# Patient Record
Sex: MALE | Race: BLACK OR AFRICAN AMERICAN | NOT HISPANIC OR LATINO
[De-identification: names, ages, dates, MRNs, and addresses within clinical notes are randomized per-mention and may not be internally consistent; named-entity substitution may affect disease eponyms.]

---

## 2020-03-10 ENCOUNTER — APPOINTMENT (OUTPATIENT)
Dept: UROLOGY | Facility: CLINIC | Age: 70
End: 2020-03-10
Payer: MEDICARE

## 2020-03-10 VITALS
TEMPERATURE: 97.9 F | HEIGHT: 70 IN | DIASTOLIC BLOOD PRESSURE: 77 MMHG | BODY MASS INDEX: 21.9 KG/M2 | OXYGEN SATURATION: 98 % | RESPIRATION RATE: 13 BRPM | HEART RATE: 79 BPM | SYSTOLIC BLOOD PRESSURE: 117 MMHG | WEIGHT: 153 LBS

## 2020-03-10 DIAGNOSIS — R31.21 ASYMPTOMATIC MICROSCOPIC HEMATURIA: ICD-10-CM

## 2020-03-10 DIAGNOSIS — Z78.9 OTHER SPECIFIED HEALTH STATUS: ICD-10-CM

## 2020-03-10 DIAGNOSIS — N52.9 MALE ERECTILE DYSFUNCTION, UNSPECIFIED: ICD-10-CM

## 2020-03-10 DIAGNOSIS — I10 ESSENTIAL (PRIMARY) HYPERTENSION: ICD-10-CM

## 2020-03-10 DIAGNOSIS — R35.1 BENIGN PROSTATIC HYPERPLASIA WITH LOWER URINARY TRACT SYMPMS: ICD-10-CM

## 2020-03-10 DIAGNOSIS — Z86.39 PERSONAL HISTORY OF OTHER ENDOCRINE, NUTRITIONAL AND METABOLIC DISEASE: ICD-10-CM

## 2020-03-10 DIAGNOSIS — N40.1 BENIGN PROSTATIC HYPERPLASIA WITH LOWER URINARY TRACT SYMPMS: ICD-10-CM

## 2020-03-10 DIAGNOSIS — R63.4 ABNORMAL WEIGHT LOSS: ICD-10-CM

## 2020-03-10 DIAGNOSIS — E78.00 PURE HYPERCHOLESTEROLEMIA, UNSPECIFIED: ICD-10-CM

## 2020-03-10 PROBLEM — Z00.00 ENCOUNTER FOR PREVENTIVE HEALTH EXAMINATION: Status: ACTIVE | Noted: 2020-03-10

## 2020-03-10 PROCEDURE — 99204 OFFICE O/P NEW MOD 45 MIN: CPT

## 2020-03-10 RX ORDER — SIMVASTATIN 20 MG/1
20 TABLET, FILM COATED ORAL
Refills: 0 | Status: ACTIVE | COMMUNITY

## 2020-03-10 RX ORDER — TAMSULOSIN HYDROCHLORIDE 0.4 MG/1
0.4 CAPSULE ORAL
Qty: 90 | Refills: 2 | Status: ACTIVE | COMMUNITY
Start: 2020-03-10 | End: 1900-01-01

## 2020-03-10 RX ORDER — METFORMIN HYDROCHLORIDE 500 MG/1
500 TABLET, COATED ORAL
Refills: 0 | Status: ACTIVE | COMMUNITY

## 2020-03-10 NOTE — REVIEW OF SYSTEMS
[Feeling Tired] : feeling tired [Poor quality erections] : Poor quality erections [Discharge from urine canal] : discharge from urine canal [Bladder pressure] : experiences bladder pressure [Slow urine stream] : slow urine stream [Leakage of urine with urgency] : leakage of urine with urgency [Leakage of urine with straining, coughing, laughing] : leakage of urine with straining, coughing, laughing [Joint Pain] : joint pain [Anxiety] : anxiety [Depression] : depression [Muscle Weakness] : muscle weakness [Negative] : Heme/Lymph

## 2020-03-10 NOTE — ASSESSMENT
[FreeTextEntry1] : We had a long discussion regarding patient's urinary symptoms. Initial workup with cystoscopy, determination of urinary flow rate, post void residual volume and urodynamic study was discussed. We discussed conservative management without using medications such as controlling constipation, limiting fluids before bed-time, and limiting irritating substances such as coffee, tea, alcohol, spicy foods, etc. We also discussed medication therapy for treatment of urinary symptoms with alpha-blocker therapy (such as Flomax, Rapaflo), 5 alpha reductase inhibitors (such as Proscar and Avodart), Cialis 5 mg daily especially if ED is present, anticholinergic medications (such as VESIcare, Toviaz), Myrbetriq or a combination of the above medications. Treatment of overactive bladder symptoms with Botox intravesical injections was reviewed. Also surgical treatment options such as office microwave thermotherapy, photo-vaporization of the prostate (Greenlight laser), TURP or suprapubic prostatectomy based on the size of the prostate, were discussed and side effects reviewed. Questions were answered. He will start by limiting intake of sweet drinks and checking with his endocrinologist to improve diabetes. For now, he will start Flomax. We also have discussed using daily Cialis for both urinary symptoms and erectile dysfunction. He would rather improve urinary symptoms first.\par PSA level and urinalysis will be checked. When he returns to the office in a few months, we could start addressing erectile dysfunction also. We discussed the difference between microscopic and gross hematuria. Potential benign and malignant urological conditions that can cause hematuria were reviewed. Workup including renal imaging with ultrasonography or CT scan, urine studies and cystoscopy were reviewed. Risks of cystoscopy were discussed. Patient was made aware that despite adequate workup, the cause for hematuria may not be discovered and continued followup is recommended. This could also be addressed after management of urinary symptoms.\par \par Adán Gant MD, FACS\par [facility]\par  of Urology\par \par [address], [address]\[place] [address]\par \par [address], [address]\[place] [address]\par \par Tel: [phone]\par Fax: [phone]

## 2020-03-10 NOTE — PHYSICAL EXAM
[General Appearance - Well Developed] : well developed [Normal Appearance] : normal appearance [Well Groomed] : well groomed [General Appearance - In No Acute Distress] : no acute distress [Edema] : no peripheral edema [Respiration, Rhythm And Depth] : normal respiratory rhythm and effort [Exaggerated Use Of Accessory Muscles For Inspiration] : no accessory muscle use [Abdomen Soft] : soft [Abdomen Tenderness] : non-tender [Costovertebral Angle Tenderness] : no ~M costovertebral angle tenderness [Urethral Meatus] : meatus normal [Penis Abnormality] : normal circumcised penis [Urinary Bladder Findings] : the bladder was normal on palpation [Scrotum] : the scrotum was normal [Testes Mass (___cm)] : there were no testicular masses [Prostate Tenderness] : the prostate was not tender [No Prostate Nodules] : no prostate nodules [Prostate Enlarged] : was enlarged [Normal Station and Gait] : the gait and station were normal for the patient's age [] : no rash [No Focal Deficits] : no focal deficits [Oriented To Time, Place, And Person] : oriented to person, place, and time [Mood] : the mood was normal [Not Anxious] : not anxious [Inguinal Lymph Nodes Enlarged Bilaterally] : inguinal

## 2020-03-10 NOTE — HISTORY OF PRESENT ILLNESS
[FreeTextEntry1] : He is a 69-year-old man who is seen today for initial visit. He has nocturia more than 5 times and daytime urinary frequency and urgency every hour. He has urge incontinence. Urinary flow sometimes is normal and sometimes slow with hesitancy. He has history of diabetes but has not seen his primary care recently and his glucose monitoring system is not working. He drinks over 1 gallon of soda, iced tea, juice and water a day. Today he limited fluid intake because he was coming to the office and has not needed to urinate in the last 4-6 hours. Residual urine today was less than 50 cc. In 2019, hemoglobin A1c was 8.5 and urinalysis showed red blood cells, glucose and protein. PSA level was 3.3 in 2018. He is a non-smoker. He never had gross hematuria. He has postvoid dribbling. Also the has not had a good erection in many years. He has a bowel movement as soon as he eats. He has lost weight but does not know how much. AUA symptom score was 27.

## 2020-03-10 NOTE — LETTER BODY
[Dear  ___] : Dear  [unfilled], [Consult Letter:] : I had the pleasure of evaluating your patient, [unfilled]. [Consult Closing:] : Thank you very much for allowing me to participate in the care of this patient.  If you have any questions, please do not hesitate to contact me. [FreeTextEntry1] : [facility]\par [address] \par [place], [place], [address]  \par [phone] \par

## 2020-03-11 LAB
APPEARANCE: CLEAR
BACTERIA: NEGATIVE
BILIRUBIN URINE: NEGATIVE
BLOOD URINE: NEGATIVE
COLOR: YELLOW
GLUCOSE QUALITATIVE U: ABNORMAL
HYALINE CASTS: 1 /LPF
KETONES URINE: NEGATIVE
LEUKOCYTE ESTERASE URINE: NEGATIVE
MICROSCOPIC-UA: NORMAL
NITRITE URINE: NEGATIVE
PH URINE: 5.5
PROTEIN URINE: NORMAL
PSA SERPL-MCNC: 3.31 NG/ML
RED BLOOD CELLS URINE: 1 /HPF
SPECIFIC GRAVITY URINE: 1.04
SQUAMOUS EPITHELIAL CELLS: 0 /HPF
UROBILINOGEN URINE: NORMAL
WHITE BLOOD CELLS URINE: 1 /HPF

## 2020-06-10 ENCOUNTER — APPOINTMENT (OUTPATIENT)
Dept: UROLOGY | Facility: CLINIC | Age: 70
End: 2020-06-10

## 2021-05-08 ENCOUNTER — INPATIENT (INPATIENT)
Facility: HOSPITAL | Age: 71
LOS: 2 days | Discharge: HOME | End: 2021-05-11
Attending: HOSPITALIST | Admitting: HOSPITALIST
Payer: MEDICARE

## 2021-05-08 VITALS
DIASTOLIC BLOOD PRESSURE: 75 MMHG | RESPIRATION RATE: 18 BRPM | HEART RATE: 92 BPM | TEMPERATURE: 98 F | SYSTOLIC BLOOD PRESSURE: 129 MMHG | OXYGEN SATURATION: 100 %

## 2021-05-08 DIAGNOSIS — N28.1 CYST OF KIDNEY, ACQUIRED: ICD-10-CM

## 2021-05-08 DIAGNOSIS — Z29.9 ENCOUNTER FOR PROPHYLACTIC MEASURES, UNSPECIFIED: ICD-10-CM

## 2021-05-08 DIAGNOSIS — Z02.9 ENCOUNTER FOR ADMINISTRATIVE EXAMINATIONS, UNSPECIFIED: ICD-10-CM

## 2021-05-08 DIAGNOSIS — E87.2 ACIDOSIS: ICD-10-CM

## 2021-05-08 DIAGNOSIS — I10 ESSENTIAL (PRIMARY) HYPERTENSION: ICD-10-CM

## 2021-05-08 DIAGNOSIS — E78.5 HYPERLIPIDEMIA, UNSPECIFIED: ICD-10-CM

## 2021-05-08 DIAGNOSIS — E87.1 HYPO-OSMOLALITY AND HYPONATREMIA: ICD-10-CM

## 2021-05-08 DIAGNOSIS — R73.9 HYPERGLYCEMIA, UNSPECIFIED: ICD-10-CM

## 2021-05-08 DIAGNOSIS — E11.65 TYPE 2 DIABETES MELLITUS WITH HYPERGLYCEMIA: ICD-10-CM

## 2021-05-08 DIAGNOSIS — Z98.890 OTHER SPECIFIED POSTPROCEDURAL STATES: Chronic | ICD-10-CM

## 2021-05-08 DIAGNOSIS — M88.9 OSTEITIS DEFORMANS OF UNSPECIFIED BONE: ICD-10-CM

## 2021-05-08 LAB
ALBUMIN SERPL ELPH-MCNC: 4.1 G/DL — SIGNIFICANT CHANGE UP (ref 3.3–5)
ALP SERPL-CCNC: 2829 U/L — HIGH (ref 40–120)
ALT FLD-CCNC: 5 U/L — SIGNIFICANT CHANGE UP (ref 4–41)
ANION GAP SERPL CALC-SCNC: 12 MMOL/L — SIGNIFICANT CHANGE UP (ref 7–14)
ANION GAP SERPL CALC-SCNC: 15 MMOL/L — HIGH (ref 7–14)
APPEARANCE UR: CLEAR — SIGNIFICANT CHANGE UP
AST SERPL-CCNC: 14 U/L — SIGNIFICANT CHANGE UP (ref 4–40)
B-OH-BUTYR SERPL-SCNC: 0.2 MMOL/L — SIGNIFICANT CHANGE UP (ref 0–0.4)
B-OH-BUTYR SERPL-SCNC: 0.4 MMOL/L — SIGNIFICANT CHANGE UP (ref 0–0.4)
BASOPHILS # BLD AUTO: 0.04 K/UL — SIGNIFICANT CHANGE UP (ref 0–0.2)
BASOPHILS NFR BLD AUTO: 0.5 % — SIGNIFICANT CHANGE UP (ref 0–2)
BILIRUB SERPL-MCNC: 1.7 MG/DL — HIGH (ref 0.2–1.2)
BILIRUB UR-MCNC: NEGATIVE — SIGNIFICANT CHANGE UP
BLOOD GAS VENOUS COMPREHENSIVE RESULT: SIGNIFICANT CHANGE UP
BUN SERPL-MCNC: 8 MG/DL — SIGNIFICANT CHANGE UP (ref 7–23)
BUN SERPL-MCNC: 9 MG/DL — SIGNIFICANT CHANGE UP (ref 7–23)
CALCIUM SERPL-MCNC: 8.1 MG/DL — LOW (ref 8.4–10.5)
CALCIUM SERPL-MCNC: 8.8 MG/DL — SIGNIFICANT CHANGE UP (ref 8.4–10.5)
CHLORIDE SERPL-SCNC: 94 MMOL/L — LOW (ref 98–107)
CHLORIDE SERPL-SCNC: 98 MMOL/L — SIGNIFICANT CHANGE UP (ref 98–107)
CO2 SERPL-SCNC: 23 MMOL/L — SIGNIFICANT CHANGE UP (ref 22–31)
CO2 SERPL-SCNC: 24 MMOL/L — SIGNIFICANT CHANGE UP (ref 22–31)
COLOR SPEC: SIGNIFICANT CHANGE UP
CREAT SERPL-MCNC: 0.9 MG/DL — SIGNIFICANT CHANGE UP (ref 0.5–1.3)
CREAT SERPL-MCNC: 0.91 MG/DL — SIGNIFICANT CHANGE UP (ref 0.5–1.3)
D DIMER BLD IA.RAPID-MCNC: 172 NG/ML DDU — SIGNIFICANT CHANGE UP
DIFF PNL FLD: NEGATIVE — SIGNIFICANT CHANGE UP
EOSINOPHIL # BLD AUTO: 0.04 K/UL — SIGNIFICANT CHANGE UP (ref 0–0.5)
EOSINOPHIL NFR BLD AUTO: 0.5 % — SIGNIFICANT CHANGE UP (ref 0–6)
GLUCOSE BLDC GLUCOMTR-MCNC: 228 MG/DL — HIGH (ref 70–99)
GLUCOSE BLDC GLUCOMTR-MCNC: 240 MG/DL — HIGH (ref 70–99)
GLUCOSE SERPL-MCNC: 578 MG/DL — CRITICAL HIGH (ref 70–99)
GLUCOSE SERPL-MCNC: 599 MG/DL — CRITICAL HIGH (ref 70–99)
GLUCOSE UR QL: ABNORMAL
HCT VFR BLD CALC: 42.4 % — SIGNIFICANT CHANGE UP (ref 39–50)
HGB BLD-MCNC: 15.2 G/DL — SIGNIFICANT CHANGE UP (ref 13–17)
IANC: 5.07 K/UL — SIGNIFICANT CHANGE UP (ref 1.5–8.5)
IMM GRANULOCYTES NFR BLD AUTO: 0.3 % — SIGNIFICANT CHANGE UP (ref 0–1.5)
KETONES UR-MCNC: NEGATIVE — SIGNIFICANT CHANGE UP
LEUKOCYTE ESTERASE UR-ACNC: NEGATIVE — SIGNIFICANT CHANGE UP
LYMPHOCYTES # BLD AUTO: 1.86 K/UL — SIGNIFICANT CHANGE UP (ref 1–3.3)
LYMPHOCYTES # BLD AUTO: 24.9 % — SIGNIFICANT CHANGE UP (ref 13–44)
MCHC RBC-ENTMCNC: 29.3 PG — SIGNIFICANT CHANGE UP (ref 27–34)
MCHC RBC-ENTMCNC: 35.8 GM/DL — SIGNIFICANT CHANGE UP (ref 32–36)
MCV RBC AUTO: 81.7 FL — SIGNIFICANT CHANGE UP (ref 80–100)
MONOCYTES # BLD AUTO: 0.43 K/UL — SIGNIFICANT CHANGE UP (ref 0–0.9)
MONOCYTES NFR BLD AUTO: 5.8 % — SIGNIFICANT CHANGE UP (ref 2–14)
NEUTROPHILS # BLD AUTO: 5.07 K/UL — SIGNIFICANT CHANGE UP (ref 1.8–7.4)
NEUTROPHILS NFR BLD AUTO: 68 % — SIGNIFICANT CHANGE UP (ref 43–77)
NITRITE UR-MCNC: NEGATIVE — SIGNIFICANT CHANGE UP
NRBC # BLD: 0 /100 WBCS — SIGNIFICANT CHANGE UP
NRBC # FLD: 0 K/UL — SIGNIFICANT CHANGE UP
NT-PROBNP SERPL-SCNC: 45 PG/ML — SIGNIFICANT CHANGE UP
PH UR: 6 — SIGNIFICANT CHANGE UP (ref 5–8)
PLATELET # BLD AUTO: 245 K/UL — SIGNIFICANT CHANGE UP (ref 150–400)
POTASSIUM SERPL-MCNC: 3.7 MMOL/L — SIGNIFICANT CHANGE UP (ref 3.5–5.3)
POTASSIUM SERPL-MCNC: 4.7 MMOL/L — SIGNIFICANT CHANGE UP (ref 3.5–5.3)
POTASSIUM SERPL-SCNC: 3.7 MMOL/L — SIGNIFICANT CHANGE UP (ref 3.5–5.3)
POTASSIUM SERPL-SCNC: 4.7 MMOL/L — SIGNIFICANT CHANGE UP (ref 3.5–5.3)
PROT SERPL-MCNC: 7.5 G/DL — SIGNIFICANT CHANGE UP (ref 6–8.3)
PROT UR-MCNC: NEGATIVE — SIGNIFICANT CHANGE UP
RBC # BLD: 5.19 M/UL — SIGNIFICANT CHANGE UP (ref 4.2–5.8)
RBC # FLD: 13 % — SIGNIFICANT CHANGE UP (ref 10.3–14.5)
SARS-COV-2 RNA SPEC QL NAA+PROBE: SIGNIFICANT CHANGE UP
SODIUM SERPL-SCNC: 133 MMOL/L — LOW (ref 135–145)
SODIUM SERPL-SCNC: 133 MMOL/L — LOW (ref 135–145)
SP GR SPEC: 1.04 — HIGH (ref 1.01–1.02)
TROPONIN T, HIGH SENSITIVITY RESULT: 28 NG/L — SIGNIFICANT CHANGE UP
TROPONIN T, HIGH SENSITIVITY RESULT: 30 NG/L — SIGNIFICANT CHANGE UP
UROBILINOGEN FLD QL: SIGNIFICANT CHANGE UP
WBC # BLD: 7.46 K/UL — SIGNIFICANT CHANGE UP (ref 3.8–10.5)
WBC # FLD AUTO: 7.46 K/UL — SIGNIFICANT CHANGE UP (ref 3.8–10.5)

## 2021-05-08 PROCEDURE — 99285 EMERGENCY DEPT VISIT HI MDM: CPT | Mod: CS,25

## 2021-05-08 PROCEDURE — 71046 X-RAY EXAM CHEST 2 VIEWS: CPT | Mod: 26

## 2021-05-08 PROCEDURE — 93010 ELECTROCARDIOGRAM REPORT: CPT

## 2021-05-08 PROCEDURE — 71275 CT ANGIOGRAPHY CHEST: CPT | Mod: 26

## 2021-05-08 PROCEDURE — 74174 CTA ABD&PLVS W/CONTRAST: CPT | Mod: 26

## 2021-05-08 PROCEDURE — 99221 1ST HOSP IP/OBS SF/LOW 40: CPT | Mod: GC

## 2021-05-08 RX ORDER — SODIUM CHLORIDE 9 MG/ML
1000 INJECTION INTRAMUSCULAR; INTRAVENOUS; SUBCUTANEOUS
Refills: 0 | Status: DISCONTINUED | OUTPATIENT
Start: 2021-05-08 | End: 2021-05-09

## 2021-05-08 RX ORDER — POTASSIUM CHLORIDE 20 MEQ
20 PACKET (EA) ORAL ONCE
Refills: 0 | Status: COMPLETED | OUTPATIENT
Start: 2021-05-08 | End: 2021-05-08

## 2021-05-08 RX ORDER — SODIUM CHLORIDE 9 MG/ML
1000 INJECTION INTRAMUSCULAR; INTRAVENOUS; SUBCUTANEOUS ONCE
Refills: 0 | Status: COMPLETED | OUTPATIENT
Start: 2021-05-08 | End: 2021-05-08

## 2021-05-08 RX ORDER — ASPIRIN/CALCIUM CARB/MAGNESIUM 324 MG
162 TABLET ORAL DAILY
Refills: 0 | Status: DISCONTINUED | OUTPATIENT
Start: 2021-05-08 | End: 2021-05-09

## 2021-05-08 RX ORDER — INSULIN LISPRO 100/ML
6 VIAL (ML) SUBCUTANEOUS ONCE
Refills: 0 | Status: COMPLETED | OUTPATIENT
Start: 2021-05-08 | End: 2021-05-08

## 2021-05-08 RX ORDER — INSULIN GLARGINE 100 [IU]/ML
10 INJECTION, SOLUTION SUBCUTANEOUS AT BEDTIME
Refills: 0 | Status: DISCONTINUED | OUTPATIENT
Start: 2021-05-08 | End: 2021-05-09

## 2021-05-08 RX ADMIN — Medication 162 MILLIGRAM(S): at 13:20

## 2021-05-08 RX ADMIN — SODIUM CHLORIDE 1000 MILLILITER(S): 9 INJECTION INTRAMUSCULAR; INTRAVENOUS; SUBCUTANEOUS at 14:07

## 2021-05-08 RX ADMIN — Medication 6 UNIT(S): at 14:05

## 2021-05-08 RX ADMIN — INSULIN GLARGINE 10 UNIT(S): 100 INJECTION, SOLUTION SUBCUTANEOUS at 21:50

## 2021-05-08 RX ADMIN — SODIUM CHLORIDE 1000 MILLILITER(S): 9 INJECTION INTRAMUSCULAR; INTRAVENOUS; SUBCUTANEOUS at 13:20

## 2021-05-08 RX ADMIN — SODIUM CHLORIDE 1000 MILLILITER(S): 9 INJECTION INTRAMUSCULAR; INTRAVENOUS; SUBCUTANEOUS at 14:16

## 2021-05-08 RX ADMIN — Medication 20 MILLIEQUIVALENT(S): at 14:05

## 2021-05-08 RX ADMIN — SODIUM CHLORIDE 75 MILLILITER(S): 9 INJECTION INTRAMUSCULAR; INTRAVENOUS; SUBCUTANEOUS at 21:34

## 2021-05-08 NOTE — ED PROVIDER NOTE - OBJECTIVE STATEMENT
69 Y/O M PMH DM HTN HLD not on medication for 4 years due to non-compliance arrives via ambulance. Pt states he currently feels well, states he has just had achying in his arms and neck. Spoke to pt's wife for collateral at [phone] 71 Y/O M PMH DM HTN HLD not on medication for 4 years due to non-compliance arrives via ambulance. Pt states he currently feels well, states he has just had aching in his arms and neck. Spoke to pt's wife for collateral at . States that pt was C/O Chest pain, states that he felt like he could not breathe and endorsed pain in his R arm. States that pt does not take any of his medicine and mostly sits at home drinking Pepsi. Pt denies any complaints at the moment.

## 2021-05-08 NOTE — H&P ADULT - PROBLEM SELECTOR PLAN 6
BP stable for now  Pt has not been on antihypertensives for years  Will monitor BP, if elevated can start on CCB, ACEi, or ARB BP stable for now  Pt has not been on antihypertensives for years  Will monitor BP, if elevated can start on ACEi or ARB

## 2021-05-08 NOTE — H&P ADULT - HISTORY OF PRESENT ILLNESS
71 y/o male with pmh of HTN, DM, and HLD presents to the ED for increasing fatigue. The patient stated this morning, he was laying in bed with his wife and was not "feeling well." His wife tried to elicit conversation from him, however the patient refrained from speaking with her and due to this concern she called the ambulance. The patient states he did not have any SOB, chest pain, or palpitations. No reported abdominal discomfort, nausea, or episodes of emesis. He was not lightheaded and did not lose consciousness. He has been having polydipsia and polyuria, denies any signs of neuropathy of upper or lower extremities and any change in vision from his baseline (wears glasses). He admits that he has not seen a primary care doctor in "years" and that he has not left his house in 4 years, since he retired. He stays in bed most of the day, by choice, however states he is able to ambulate without any assistance. He does not take any medications for his chronic medical conditions. He notes weight loss within the past 4 years from a baseline of 170-175 to 148 and endorses a good appetite. Admits to having multiple types of snacks present in his drawers.    ED VS: 98 F, 92, 129/75, 100% on RA. Fingerstick: 535   ED tx: 10 U Lantus subq and 6 U Admelog, 2L NS bolus  EKG : NSR, RBBB, TWI in leads V1-V3 69 y/o male with pmh of HTN, DM, and HLD presents to the ED for increasing fatigue. The patient stated this morning, he was laying in bed with his wife and was not "feeling well." His wife tried to elicit conversation from him, however the patient refrained from speaking with her and due to this concern she called the ambulance. The patient states he did not have any SOB, chest pain, or palpitations. No reported abdominal discomfort, nausea, or episodes of emesis. He was not lightheaded and did not lose consciousness. He has been having polydipsia and polyuria, denies any signs of neuropathy of upper or lower extremities and any change in vision from his baseline (wears glasses). Noted by pt's wife, to have been SOB and with right shoulder discomfort prior to coming to the ED. Wife states on several occasions the patient did not "look well," and she has called the ambulance every 2 months for these symptoms, the patient normally refuses to go to the hospital, however this time he agreed to. He admits that he has not seen a primary care doctor in "years" and that he has not left his house in 4 years, since he retired. He stays in bed most of the day, by choice, however states he is able to ambulate without any assistance. He does not take any medications for his chronic medical conditions. He notes weight loss within the past 4 years from a baseline of 170-175 to 148 and endorses a good appetite. Admits to having multiple types of snacks present in his drawers.    ED VS: 98 F, 92, 129/75, 100% on RA. Fingerstick: 535   ED tx: 10 U Lantus subq and 6 U Admelog, 2L NS bolus  EKG : NSR, RBBB, TWI in leads V1-V3 71 y/o male with pmh of HTN, DM, and HLD presents to the ED for increasing fatigue. The patient stated this morning, he was laying in bed with his wife and was not "feeling well." His wife tried to elicit conversation from him, however the patient refrained from speaking with her and due to this concern she called the ambulance. The patient states he did not have any SOB, chest pain, or palpitations. No reported abdominal discomfort, nausea, or episodes of emesis. He was not lightheaded and did not lose consciousness. He has been having polydipsia and polyuria, denies any signs of neuropathy of upper or lower extremities and any change in vision from his baseline (wears glasses). Noted by pt's wife, to have been SOB and with right shoulder discomfort prior to coming to the ED. Wife states on several occasions the patient did not "look well," and she has called the ambulance every 2 months for these symptoms, the patient normally refuses to go to the hospital, however this time he agreed to. He admits that he has not seen a primary care doctor in "years" and that he has not left his house in 4 years, since he retired. He stays in bed most of the day, by choice, however states he is able to ambulate without any assistance. He does not take any medications for his chronic medical conditions. He notes weight loss within the past 4 years from a baseline of 170-175 to 148 and endorses a good appetite. Admits to having multiple types of snacks present in his drawers. Drinks a lot of soda and eats a lot of chips.    ED VS: 98 F, 92, 129/75, 100% on RA. Fingerstick: 535   ED tx: 10 U Lantus subq and 6 U Admelog, 2L NS bolus  EKG : NSR, RBBB, TWI in leads V1-V3

## 2021-05-08 NOTE — H&P ADULT - NSHPSOCIALHISTORY_GEN_ALL_CORE
Retired. 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  Has stopped smoking for 40 years now, did smoke from [age] (22 years, approximately 1 pack/week).  Previously used marijuana and cocaine, has stopped since 1997.  Previous alcohol use, stopped in 2010.   to wife, lives with her.   Ambulates on own without assistance.

## 2021-05-08 NOTE — H&P ADULT - PROBLEM SELECTOR PLAN 1
Initial symptom of weakness as per patient, found to be hyperglycemic with BS: 535, acidotic (7.32 downtrending to 7.28), however HCO3 normal, U/A negative for ketones, positive for glucose, AG: 15 to 12, and with normal mental status.  S/p 10 U Lantus and 6 U Admelog in ED  Start on 14 U Lantus and 5 U Admelog TID  Can adjust insulin regimen as pt starts on diet  Maintain inpatient goal of BS: 140-180  Carb-consistent Diet  Monitor fingersticks  Monitor BMP QD Initial symptom of weakness as per patient, found to be hyperglycemic with BS: 535, acidotic (7.32 downtrending to 7.28), however HCO3 normal, U/A negative for ketones, positive for glucose, AG: 15 to 12, and with normal mental status. A1c: 12.8 (5/2021)  S/p 10 U Lantus and 6 U Admelog in ED  Start on 14 U Lantus and 5 U Admelog TID  Can adjust insulin regimen as pt starts on diet  Maintain inpatient goal of BS: 140-180  Carb-consistent Diet  Monitor fingersticks  Monitor BMP QD Initial symptom of weakness as per patient, found to be hyperglycemic with BS: 535, acidotic (7.32 downtrending to 7.28), however HCO3 normal, U/A negative for ketones, positive for glucose, AG: 15 to 12, and with normal mental status. A1c: 12.8 (5/2021)  S/p 10 U Lantus and 6 U Admelog in ED  Start on 14 U Lantus and 5 U Admelog TID  Can adjust insulin regimen as pt starts on diet  Endocrine consult in AM  Maintain inpatient goal of BS: 140-180  Carb-consistent Diet  Monitor fingersticks  Monitor BMP QD Initial symptom of weakness as per patient, found to be hyperglycemic with BS: 535, acidotic (7.32 downtrending to 7.28), however HCO3 normal, U/A negative for ketones, positive for glucose, AG: 15 to 12, and with normal mental status. A1c: 12.8 (5/2021)  S/p 10 U Lantus and 6 U Admelog in ED  Start on 14 U Lantus and 5 U Admelog TID  Start on moderate insulin sliding scale  Can adjust insulin regimen as pt starts on diet  Endocrine consult in AM  Maintain inpatient goal of BS: 140-180  Carb-consistent Diet  Monitor fingersticks  Monitor BMP QD Initial symptom of weakness as per patient, found to be hyperglycemic with BS: 535, acidotic (7.32 downtrending to 7.28), however HCO3 normal, U/A negative for ketones, positive for glucose, AG: 15 to 12, and with normal mental status. A1c: 12.8 (5/2021)  S/p 10 U Lantus and 6 U Admelog in ED  Start on 14 U Lantus and 5 U Admelog TID  Start on moderate insulin sliding scale  Can adjust insulin regimen as pt starts on diet  Endocrine consult in AM  Will require Diabetes education and teaching  Maintain inpatient goal of BS: 140-180  Carb-consistent Diet  Monitor fingersticks  Monitor BMP QD

## 2021-05-08 NOTE — ED PROVIDER NOTE - ATTENDING CONTRIBUTION TO CARE
70M p/w SOB.  EKG SR at 94 no lui sm.  deep TWI anteriorly however RBBB poss strain.  qtc 492  pr 148.  h/o HTN DM.  Pt reports occasional SOB.  R arm soreness and R neck soreness.  Apparently wife called EMS because he was laying about the house.  Found to have 's; will rx fluids, check labs r/o DKA.  Given SOB and EKG change will send dimer and trop.  ROSA Fontana spoke to his wife who said he had chest pain and arm pain and said he couldn't breathe.  Will admit r/o ACS, glucose mgmt.  Wife reports she found a whole fridge she didn't know about filled with sugary drinks and disposed of them.  Pt agreeable to admission for glucose mgmt.  Would not place in CDU given abnormal EKG.  VS:  unremarkable    GEN - NAD;  malaise;   A+O x3 Very thin. Loose clothes.  HEAD - NC/AT     ENT - PEERL, EOMI, mucous membranes  dry , no discharge      NECK: Neck supple, non-tender without lymphadenopathy, no masses, no JVD  PULM - CTA b/l,  symmetric breath sounds  COR -  normal heart sounds    ABD - , ND, NT, soft,  BACK - no CVA tenderness, nontender spine     EXTREMS - no edema, no deformity, warm and well perfused    SKIN - no rash    or bruising      NEUROLOGIC - alert, face symmetric, speech fluent, sensation nl, motor no focal deficit.

## 2021-05-08 NOTE — H&P ADULT - PROBLEM SELECTOR PLAN 2
Secondary to hyperglycemia, corrected Na: 141  Will likely improve as sugars become normalized  Will monitor BMP QD Noted to have pH: 7.32 downtrending to 7.28, with elevated lactate of 3.8 uptrended to 4.7, mild anion gap of 15 downtrended to 12  S/p 2L NS bolus in ED  Most likely Type 2 lactic acidosis 2/2 to the patient's hyperglycemia  Continue on NS IVF, reassess volume status in AM  Will check serum lactate in AM Noted to have pH: 7.32 downtrending to 7.28, with elevated lactate of 3.8 uptrended to 4.7, mild anion gap of 15 downtrended to 12  S/p 2L NS bolus in ED  Most likely Type B lactic acidosis 2/2 to the patient's hyperglycemia  Continue on NS IVF, reassess volume status in AM  Will check serum lactate in AM

## 2021-05-08 NOTE — H&P ADULT - PROBLEM SELECTOR PLAN 7
Hx of HLD  Will order lipid panel in AM  Start on atorvastatin Hx of HLD  Will order lipid panel in AM  Consider starting pt on a statin

## 2021-05-08 NOTE — H&P ADULT - PROBLEM SELECTOR PLAN 9
PCP: Wife to provide   Dispo: PT consult in AM 1. Name of PCP: Dr. Sean Evans  2. PCP contacted on Admission: []Y []N  3. PCP contacted at Discharge: []Y []N  4. Post-Discharge Appointment Date and Location:   5. Summary of Handoff given to PCP:

## 2021-05-08 NOTE — H&P ADULT - PROBLEM SELECTOR PLAN 5
Noted to have cortical thickening and sclerosis of right clavicle, pubic symphysis, and ischium.  Findings suggestive of Paget's disease.  Notable alkaline phosphatase of 2829, pt does have b/l shoulder and neck pain  Will benefit from endocrine consult in AM for possibly starting on bisphosphonates

## 2021-05-08 NOTE — ED PROVIDER NOTE - CLINICAL SUMMARY MEDICAL DECISION MAKING FREE TEXT BOX
69 Y/O M PMH DM HTN HLD not on medication for 4 years due to non-compliance arrives via ambulance. Pt states he currently feels well, states he has just had aching in his arms and neck.  Plan is trop to eval for NSTEMI, D-dimer to eval for PE and CXR to eval for consolidation. labs sent to R/O DKA. If potassium is normal will initiate insulin treatment, currently giving pt fluids. Pt appears to minimize symptoms and has been poorly compliant.

## 2021-05-08 NOTE — H&P ADULT - NSHPREVIEWOFSYSTEMS_GEN_ALL_CORE
REVIEW OF SYSTEMS:    CONSTITUTIONAL: Increased fatigue. No fevers or chills. Weight loss (currently 148 lbs, was previously 170-175, however 4 years ago)  EYES/ENT: No visual changes;  No vertigo or throat pain   NECK: No pain or stiffness  RESPIRATORY: No cough, wheezing, hemoptysis; No shortness of breath  CARDIOVASCULAR: No chest pain or palpitations  GASTROINTESTINAL: No abdominal or epigastric pain. No nausea, vomiting, or hematemesis; No diarrhea or constipation. No melena or hematochezia.  GENITOURINARY: Polyuria. No dysuria or hematuria.  NEUROLOGICAL: No numbness or weakness  SKIN: No itching, rashes REVIEW OF SYSTEMS:    CONSTITUTIONAL: Increased fatigue. No fevers or chills. Weight loss (currently 148 lbs, was previously 170-175, however 4 years ago)  EYES/ENT: No visual changes;  No vertigo or throat pain   NECK: No pain or stiffness  RESPIRATORY: No cough, wheezing, hemoptysis; No shortness of breath  CARDIOVASCULAR: No chest pain or palpitations  GASTROINTESTINAL: No abdominal or epigastric pain. No nausea, vomiting, or hematemesis; No diarrhea or constipation. No melena or hematochezia.  GENITOURINARY: Polyuria. No dysuria or hematuria.  NEUROLOGICAL: No numbness or weakness  ENDO: hyperglycemia, no heat intolerance  SKIN: No itching, rashes

## 2021-05-08 NOTE — ED PROVIDER NOTE - CARE PLAN
Principal Discharge DX:	Hyperglycemia  Secondary Diagnosis:	Abnormal EKG  Secondary Diagnosis:	Chest pain

## 2021-05-08 NOTE — ED ADULT NURSE REASSESSMENT NOTE - NS ED NURSE REASSESS COMMENT FT1
Patient is awake, A&O x 4, NAD, denies any s/sx at this time.  Presented to ED for not feeling well, neck and bilateral arm pain , chest pain and SOB.  Hyperglycemic upon arrival.  Per wife, patient non-complaint with medications and hasn't seen a doctor in 4 years.   Respirations equal and unlabored.  Vitals taken, FS checked and given insulin.  Admitted to med, report given and awaiting transport to unit.  Will continue to monitor patient.

## 2021-05-08 NOTE — H&P ADULT - NSHPLABSRESULTS_GEN_ALL_CORE
15.2   7.46  )-----------( 245      ( 08 May 2021 13:10 )             42.4           133<L>  |  98  |  9   ----------------------------<  578<HH>  4.7   |  23  |  0.91    Ca    8.1<L>      08 May 2021 14:38    TPro  7.5  /  Alb  4.1  /  TBili  1.7<H>  /  DBili  x   /  AST  14  /  ALT  5   /  AlkPhos  2829<H>                Urinalysis Basic - ( 08 May 2021 13:44 )    Color: Light Yellow / Appearance: Clear / S.038 / pH: x  Gluc: x / Ketone: Negative  / Bili: Negative / Urobili: <2 mg/dL   Blood: x / Protein: Negative / Nitrite: Negative   Leuk Esterase: Negative / RBC: x / WBC x   Sq Epi: x / Non Sq Epi: x / Bacteria: x      17:03 - VBG - pH: 7.28  | pCO2: 50    | pO2: <24   | Lactate: 4.7    16:07 - VBG - pH: 7.29  | pCO2: 52    | pO2: <24   | Lactate: 5.0    14:38 - VBG - pH: 7.30  | pCO2: 52    | pO2: 26    | Lactate: 3.3    13:10 - VBG - pH: 7.32  | pCO2: 50    | pO2: <24   | Lactate: 3.8        CARDIAC MARKERS ( 08 May 2021 14:42 )  x     / x     / 43 U/L / x     / x            CAPILLARY BLOOD GLUCOSE      POCT Blood Glucose.: 240 mg/dL (08 May 2021 23:10)      < from: CT Angio Abdomen and Pelvis w/ IV Cont (21 @ 18:46) >    IMPRESSION:    No thoracic or abdominal aortic dissection.    There is cortical thickening and sclerosis of the right clavicle. There is also cortical thickening and prominent trabecula in the bilateral iliac bones extending to the pubic symphysis and ischiums. The L1 vertebral body is also affected. The appearance is suggestive of Paget's disease however otheretiologies may be responsible. Correlate clinically.    Horseshoe kidney.    < end of copied text > 15.2   7.46  )-----------( 245      ( 08 May 2021 13:10 )             42.4           133<L>  |  98  |  9   ----------------------------<  578<HH>  4.7   |  23  |  0.91    Ca    8.1<L>      08 May 2021 14:38    TPro  7.5  /  Alb  4.1  /  TBili  1.7<H>  /  DBili  x   /  AST  14  /  ALT  5   /  AlkPhos  2829<H>              Urinalysis Basic - ( 08 May 2021 13:44 )    Color: Light Yellow / Appearance: Clear / S.038 / pH: x  Gluc: x / Ketone: Negative  / Bili: Negative / Urobili: <2 mg/dL   Blood: x / Protein: Negative / Nitrite: Negative   Leuk Esterase: Negative / RBC: x / WBC x   Sq Epi: x / Non Sq Epi: x / Bacteria: x      17:03 - VBG - pH: 7.28  | pCO2: 50    | pO2: <24   | Lactate: 4.7    16:07 - VBG - pH: 7.29  | pCO2: 52    | pO2: <24   | Lactate: 5.0    14:38 - VBG - pH: 7.30  | pCO2: 52    | pO2: 26    | Lactate: 3.3    13:10 - VBG - pH: 7.32  | pCO2: 50    | pO2: <24   | Lactate: 3.8        CARDIAC MARKERS ( 08 May 2021 14:42 )  x     / x     / 43 U/L / x     / x            CAPILLARY BLOOD GLUCOSE      POCT Blood Glucose.: 240 mg/dL (08 May 2021 23:10)      < from: CT Angio Abdomen and Pelvis w/ IV Cont (21 @ 18:46) >    IMPRESSION:    No thoracic or abdominal aortic dissection.    There is cortical thickening and sclerosis of the right clavicle. There is also cortical thickening and prominent trabecula in the bilateral iliac bones extending to the pubic symphysis and ischiums. The L1 vertebral body is also affected. The appearance is suggestive of Paget's disease however otheretiologies may be responsible. Correlate clinically.    Horseshoe kidney.    < end of copied text > Labs reviewed:  15.2   7.46  )-----------( 245      ( 08 May 2021 13:10 )             42.4           133<L>  |  98  |  9   ----------------------------<  578<HH>  4.7   |  23  |  0.91    Ca    8.1<L>      08 May 2021 14:38    TPro  7.5  /  Alb  4.1  /  TBili  1.7<H>  /  DBili  x   /  AST  14  /  ALT  5   /  AlkPhos  2829<H>              Urinalysis Basic - ( 08 May 2021 13:44 )    Color: Light Yellow / Appearance: Clear / S.038 / pH: x  Gluc: x / Ketone: Negative  / Bili: Negative / Urobili: <2 mg/dL   Blood: x / Protein: Negative / Nitrite: Negative   Leuk Esterase: Negative / RBC: x / WBC x   Sq Epi: x / Non Sq Epi: x / Bacteria: x      17:03 - VBG - pH: 7.28  | pCO2: 50    | pO2: <24   | Lactate: 4.7    16:07 - VBG - pH: 7.29  | pCO2: 52    | pO2: <24   | Lactate: 5.0    14:38 - VBG - pH: 7.30  | pCO2: 52    | pO2: 26    | Lactate: 3.3    13:10 - VBG - pH: 7.32  | pCO2: 50    | pO2: <24   | Lactate: 3.8        CARDIAC MARKERS ( 08 May 2021 14:42 )  x     / x     / 43 U/L / x     / x            CAPILLARY BLOOD GLUCOSE      POCT Blood Glucose.: 240 mg/dL (08 May 2021 23:10)      EKG personally reviewed and my interpretation is NSR, RBBB with repolarization abnormalities    Imaging reviewed below:    < from: CT Angio Abdomen and Pelvis w/ IV Cont (21 @ 18:46) >    IMPRESSION:    No thoracic or abdominal aortic dissection.    There is cortical thickening and sclerosis of the right clavicle. There is also cortical thickening and prominent trabecula in the bilateral iliac bones extending to the pubic symphysis and ischiums. The L1 vertebral body is also affected. The appearance is suggestive of Paget's disease however otheretiologies may be responsible. Correlate clinically.    Horseshoe kidney.    < end of copied text >

## 2021-05-08 NOTE — ED ADULT NURSE REASSESSMENT NOTE - NS ED NURSE REASSESS COMMENT FT1
at this time, ROSA Harmon aware. Pt continues to receive IVF at this time. Repeat labs drawn and sent. Will reassess.

## 2021-05-08 NOTE — H&P ADULT - ASSESSMENT
71 y/o male with pmh of HTN, DM, and HLD presents to the ED for increasing fatigue.  71 y/o male with pmh of HTN, DM, and HLD presents to the ED for increasing fatigue found to have hyperglycemia and lactic acidosis in the setting of uncontrolled diabetes mellitus.

## 2021-05-08 NOTE — H&P ADULT - NSHPPHYSICALEXAM_GEN_ALL_CORE
PHYSICAL EXAM:  T(C): 36.8 (05-08-21 @ 23:00), Max: 36.8 (05-08-21 @ 23:00)  HR: 70 (05-08-21 @ 23:00) (70 - 96)  BP: 130/76 (05-08-21 @ 23:00) (118/71 - 156/92)  RR: 18 (05-08-21 @ 23:00) (16 - 18)  SpO2: 98% (05-08-21 @ 23:00) (98% - 100%)  GENERAL: NAD, well-developed, AAOx4  HEAD:  Atraumatic, Normocephalic  EYES: EOMI, PERRLA, conjunctiva and sclera clear  NECK: Supple, No JVD  CHEST/LUNG: Clear to auscultation bilaterally; No wheezes or crackles  HEART: Regular rate and rhythm; No murmurs, rubs, or gallops  ABDOMEN: Soft, Nontender, Nondistended; Bowel sounds present  EXTREMITIES:  2+ Peripheral Pulses, No clubbing, cyanosis, or edema  NEUROLOGY: non-focal deficits, sensation intact to pinprick  SKIN: No rashes or lesions  Psych: Not depressed or anxious. PHYSICAL EXAM:  T(C): 36.8 (05-08-21 @ 23:00), Max: 36.8 (05-08-21 @ 23:00)  HR: 70 (05-08-21 @ 23:00) (70 - 96)  BP: 130/76 (05-08-21 @ 23:00) (118/71 - 156/92)  RR: 18 (05-08-21 @ 23:00) (16 - 18)  SpO2: 98% (05-08-21 @ 23:00) (98% - 100%)  GENERAL: NAD, well-developed, AAOx4  HEAD:  Atraumatic, Normocephalic  EYES: EOMI, PERRLA, conjunctiva and sclera clear  NECK: Supple, No JVD  LUNG: Clear to auscultation bilaterally; No wheezes or crackles, no tachypnea  HEART: Regular rate and rhythm; No murmurs, rubs, or gallops, no edeme  ABDOMEN: Soft, Nontender, Nondistended; Bowel sounds present  EXTREMITIES:  2+ Peripheral Pulses, No clubbing, cyanosis, or edema  NEUROLOGY: non-focal deficits, sensation intact to pinprick  SKIN: No rashes or lesions  Psych: Not depressed or anxious.

## 2021-05-08 NOTE — ED ADULT TRIAGE NOTE - CHIEF COMPLAINT QUOTE
Pt h/o htn and dm brought in for shortness of breath starting this morning and rt arm pain, pt has not taken medication for the past 4 years. Pt denies any present shortness of breath, endorsing pain to his rt arm. Pt hyperglycemic in triage.     Tram Renteria- [phone] Pt h/o htn and dm brought in for shortness of breath starting this morning and rt arm pain, pt has not taken medication for the past 4 years. Pt denies any present shortness of breath, endorsing pain to his rt arm. Pt hyperglycemic in triage.     Tram Renteria(wife)- [phone]

## 2021-05-08 NOTE — ED PROVIDER NOTE - PROGRESS NOTE DETAILS
Joseph Frankel PGY2: CT with evidence of pagets. Also most likely with HHS on presentation. Sugars have been coming down. Patient stable. Will admit for further work up and treatment.

## 2021-05-08 NOTE — H&P ADULT - PROBLEM SELECTOR PLAN 4
CTA performed originally for concern for hypoxia, incidental finding of bilateral renal cysts and horseshoe kidney, cysts not noted in any other visceral organs  Classification is based on Bosniak system  Monitor kidney function daily  Will need outpatient follow up for monitoring of renal cysts CTA performed originally for concern for hypoxia, incidental finding of bilateral renal cysts and horseshoe kidney, cysts not noted in any other visceral organs  Currently without any CVA tenderness, fevers, or chills  Monitor kidney function daily  Will need outpatient follow up for monitoring of renal cysts

## 2021-05-08 NOTE — H&P ADULT - PROBLEM SELECTOR PLAN 3
Noted to have pH: 7.32 downtrending to 7.28, with elevated lactate of 3.8 uptrended to 4.7  S/p 2L NS bolus in ED  Most likely 2/2 to Noted to have pH: 7.32 downtrending to 7.28, with elevated lactate of 3.8 uptrended to 4.7, mild anion gap of 15 downtrended to 12  S/p 2L NS bolus in ED  Most likely Type 2 lactic acidosis 2/2 to the patient's hyperglycemia, on the verge of going into DKA  Will check serum lactate in AM Noted to have pH: 7.32 downtrending to 7.28, with elevated lactate of 3.8 uptrended to 4.7, mild anion gap of 15 downtrended to 12  S/p 2L NS bolus in ED  Most likely Type 2 lactic acidosis 2/2 to the patient's hyperglycemia  Continue on NS IVF, reassess volume status in AM  Will check serum lactate in AM NSR, RBBB with repolarization abnormality. No priors to compare  -advised patient to follow up after discharge with a cardiologist for TTE  -trops flat, no CP, probnp normal, euvolemic on exam NSR, RBBB with repolarization abnormality. No priors to compare  Advised patient to follow up after discharge with a cardiologist for TTE  Trops flat, no CP, probnp normal, euvolemic on exam

## 2021-05-08 NOTE — ED ADULT NURSE NOTE - CHIEF COMPLAINT QUOTE
Pt h/o htn and dm brought in for shortness of breath starting this morning and rt arm pain, pt has not taken medication for the past 4 years. Pt denies any present shortness of breath, endorsing pain to his rt arm. Pt hyperglycemic in triage.     Tram Renteria(wife)- [phone]

## 2021-05-08 NOTE — H&P ADULT - NSICDXFAMILYHX_GEN_ALL_CORE_FT
FAMILY HISTORY:  Grandparent  Still living? Unknown  FH: diabetes mellitus, Age at diagnosis: Age Unknown

## 2021-05-08 NOTE — H&P ADULT - ATTENDING COMMENTS
70M with PMHx HTN, DM (not on meds, uncontrolled), HLD presents to the ED for increasing fatigue, polyuria, polydipsia found to have hyperglycemia to 500s and lactic acidosis in the setting of uncontrolled diabetes mellitus. Lactate improving after fluids and no signs of infection/sepsis. A1C 12.8 not on medications and will start on lantus 14u qhs, admelog 5u premeal, mod ISS, FSG goal 140-180 inpatient, consult endo. Patient may benefit from bisphosponates and can discuss with endo or initiate outpatient as well for incidental pagets disease on imaging. Regarding abnormal EKG with RBBB with repol abnormalities would f/u outpatient with cardiology for TTE. Denies CP/SOB, trops flat, probnp normal.

## 2021-05-09 DIAGNOSIS — E78.5 HYPERLIPIDEMIA, UNSPECIFIED: ICD-10-CM

## 2021-05-09 DIAGNOSIS — R74.8 ABNORMAL LEVELS OF OTHER SERUM ENZYMES: ICD-10-CM

## 2021-05-09 DIAGNOSIS — I10 ESSENTIAL (PRIMARY) HYPERTENSION: ICD-10-CM

## 2021-05-09 DIAGNOSIS — R94.31 ABNORMAL ELECTROCARDIOGRAM [ECG] [EKG]: ICD-10-CM

## 2021-05-09 DIAGNOSIS — E11.65 TYPE 2 DIABETES MELLITUS WITH HYPERGLYCEMIA: ICD-10-CM

## 2021-05-09 DIAGNOSIS — M88.9 OSTEITIS DEFORMANS OF UNSPECIFIED BONE: ICD-10-CM

## 2021-05-09 DIAGNOSIS — Z29.9 ENCOUNTER FOR PROPHYLACTIC MEASURES, UNSPECIFIED: ICD-10-CM

## 2021-05-09 LAB
24R-OH-CALCIDIOL SERPL-MCNC: 6.3 NG/ML — LOW (ref 30–80)
ANION GAP SERPL CALC-SCNC: 11 MMOL/L — SIGNIFICANT CHANGE UP (ref 7–14)
BASOPHILS # BLD AUTO: 0.04 K/UL — SIGNIFICANT CHANGE UP (ref 0–0.2)
BASOPHILS NFR BLD AUTO: 0.6 % — SIGNIFICANT CHANGE UP (ref 0–2)
BLOOD GAS VENOUS COMPREHENSIVE RESULT: SIGNIFICANT CHANGE UP
BUN SERPL-MCNC: 6 MG/DL — LOW (ref 7–23)
CALCIUM SERPL-MCNC: 8.4 MG/DL — SIGNIFICANT CHANGE UP (ref 8.4–10.5)
CHLORIDE SERPL-SCNC: 107 MMOL/L — SIGNIFICANT CHANGE UP (ref 98–107)
CHOLEST SERPL-MCNC: 159 MG/DL — SIGNIFICANT CHANGE UP
CO2 SERPL-SCNC: 22 MMOL/L — SIGNIFICANT CHANGE UP (ref 22–31)
COVID-19 SPIKE DOMAIN AB INTERP: NEGATIVE — SIGNIFICANT CHANGE UP
COVID-19 SPIKE DOMAIN ANTIBODY RESULT: 0.4 U/ML — SIGNIFICANT CHANGE UP
CREAT SERPL-MCNC: 0.68 MG/DL — SIGNIFICANT CHANGE UP (ref 0.5–1.3)
EOSINOPHIL # BLD AUTO: 0.07 K/UL — SIGNIFICANT CHANGE UP (ref 0–0.5)
EOSINOPHIL NFR BLD AUTO: 1 % — SIGNIFICANT CHANGE UP (ref 0–6)
GLUCOSE BLDC GLUCOMTR-MCNC: 141 MG/DL — HIGH (ref 70–99)
GLUCOSE BLDC GLUCOMTR-MCNC: 144 MG/DL — HIGH (ref 70–99)
GLUCOSE BLDC GLUCOMTR-MCNC: 187 MG/DL — HIGH (ref 70–99)
GLUCOSE BLDC GLUCOMTR-MCNC: 214 MG/DL — HIGH (ref 70–99)
GLUCOSE SERPL-MCNC: 179 MG/DL — HIGH (ref 70–99)
HCT VFR BLD CALC: 35 % — LOW (ref 39–50)
HCV AB S/CO SERPL IA: 0.11 S/CO — SIGNIFICANT CHANGE UP (ref 0–0.99)
HCV AB SERPL-IMP: SIGNIFICANT CHANGE UP
HDLC SERPL-MCNC: 48 MG/DL — SIGNIFICANT CHANGE UP
HGB BLD-MCNC: 12.6 G/DL — LOW (ref 13–17)
IANC: 3.4 K/UL — SIGNIFICANT CHANGE UP (ref 1.5–8.5)
IMM GRANULOCYTES NFR BLD AUTO: 0.3 % — SIGNIFICANT CHANGE UP (ref 0–1.5)
LIPID PNL WITH DIRECT LDL SERPL: 92 MG/DL — SIGNIFICANT CHANGE UP
LYMPHOCYTES # BLD AUTO: 2.88 K/UL — SIGNIFICANT CHANGE UP (ref 1–3.3)
LYMPHOCYTES # BLD AUTO: 42 % — SIGNIFICANT CHANGE UP (ref 13–44)
MAGNESIUM SERPL-MCNC: 2 MG/DL — SIGNIFICANT CHANGE UP (ref 1.6–2.6)
MCHC RBC-ENTMCNC: 28.7 PG — SIGNIFICANT CHANGE UP (ref 27–34)
MCHC RBC-ENTMCNC: 36 GM/DL — SIGNIFICANT CHANGE UP (ref 32–36)
MCV RBC AUTO: 79.7 FL — LOW (ref 80–100)
MONOCYTES # BLD AUTO: 0.44 K/UL — SIGNIFICANT CHANGE UP (ref 0–0.9)
MONOCYTES NFR BLD AUTO: 6.4 % — SIGNIFICANT CHANGE UP (ref 2–14)
NEUTROPHILS # BLD AUTO: 3.4 K/UL — SIGNIFICANT CHANGE UP (ref 1.8–7.4)
NEUTROPHILS NFR BLD AUTO: 49.7 % — SIGNIFICANT CHANGE UP (ref 43–77)
NON HDL CHOLESTEROL: 111 MG/DL — SIGNIFICANT CHANGE UP
NRBC # BLD: 0 /100 WBCS — SIGNIFICANT CHANGE UP
NRBC # FLD: 0 K/UL — SIGNIFICANT CHANGE UP
PHOSPHATE SERPL-MCNC: 2.8 MG/DL — SIGNIFICANT CHANGE UP (ref 2.5–4.5)
PLATELET # BLD AUTO: 193 K/UL — SIGNIFICANT CHANGE UP (ref 150–400)
POTASSIUM SERPL-MCNC: 3.7 MMOL/L — SIGNIFICANT CHANGE UP (ref 3.5–5.3)
POTASSIUM SERPL-SCNC: 3.7 MMOL/L — SIGNIFICANT CHANGE UP (ref 3.5–5.3)
RBC # BLD: 4.39 M/UL — SIGNIFICANT CHANGE UP (ref 4.2–5.8)
RBC # FLD: 13 % — SIGNIFICANT CHANGE UP (ref 10.3–14.5)
SARS-COV-2 IGG+IGM SERPL QL IA: 0.4 U/ML — SIGNIFICANT CHANGE UP
SARS-COV-2 IGG+IGM SERPL QL IA: NEGATIVE — SIGNIFICANT CHANGE UP
SODIUM SERPL-SCNC: 140 MMOL/L — SIGNIFICANT CHANGE UP (ref 135–145)
TRIGL SERPL-MCNC: 96 MG/DL — SIGNIFICANT CHANGE UP
WBC # BLD: 6.85 K/UL — SIGNIFICANT CHANGE UP (ref 3.8–10.5)
WBC # FLD AUTO: 6.85 K/UL — SIGNIFICANT CHANGE UP (ref 3.8–10.5)

## 2021-05-09 PROCEDURE — 72082 X-RAY EXAM ENTIRE SPI 2/3 VW: CPT | Mod: 26

## 2021-05-09 PROCEDURE — 72170 X-RAY EXAM OF PELVIS: CPT | Mod: 26

## 2021-05-09 PROCEDURE — 99223 1ST HOSP IP/OBS HIGH 75: CPT | Mod: GC

## 2021-05-09 PROCEDURE — 99233 SBSQ HOSP IP/OBS HIGH 50: CPT | Mod: GC

## 2021-05-09 RX ORDER — INSULIN GLARGINE 100 [IU]/ML
14 INJECTION, SOLUTION SUBCUTANEOUS AT BEDTIME
Refills: 0 | Status: DISCONTINUED | OUTPATIENT
Start: 2021-05-09 | End: 2021-05-09

## 2021-05-09 RX ORDER — DEXTROSE 50 % IN WATER 50 %
25 SYRINGE (ML) INTRAVENOUS ONCE
Refills: 0 | Status: DISCONTINUED | OUTPATIENT
Start: 2021-05-09 | End: 2021-05-11

## 2021-05-09 RX ORDER — SODIUM CHLORIDE 9 MG/ML
1000 INJECTION, SOLUTION INTRAVENOUS
Refills: 0 | Status: DISCONTINUED | OUTPATIENT
Start: 2021-05-09 | End: 2021-05-11

## 2021-05-09 RX ORDER — GLUCAGON INJECTION, SOLUTION 0.5 MG/.1ML
1 INJECTION, SOLUTION SUBCUTANEOUS ONCE
Refills: 0 | Status: DISCONTINUED | OUTPATIENT
Start: 2021-05-09 | End: 2021-05-11

## 2021-05-09 RX ORDER — ENOXAPARIN SODIUM 100 MG/ML
40 INJECTION SUBCUTANEOUS DAILY
Refills: 0 | Status: DISCONTINUED | OUTPATIENT
Start: 2021-05-09 | End: 2021-05-11

## 2021-05-09 RX ORDER — INSULIN LISPRO 100/ML
VIAL (ML) SUBCUTANEOUS AT BEDTIME
Refills: 0 | Status: DISCONTINUED | OUTPATIENT
Start: 2021-05-09 | End: 2021-05-09

## 2021-05-09 RX ORDER — INSULIN LISPRO 100/ML
VIAL (ML) SUBCUTANEOUS
Refills: 0 | Status: DISCONTINUED | OUTPATIENT
Start: 2021-05-09 | End: 2021-05-09

## 2021-05-09 RX ORDER — INSULIN LISPRO 100/ML
6 VIAL (ML) SUBCUTANEOUS
Refills: 0 | Status: DISCONTINUED | OUTPATIENT
Start: 2021-05-09 | End: 2021-05-11

## 2021-05-09 RX ORDER — INSULIN GLARGINE 100 [IU]/ML
10 INJECTION, SOLUTION SUBCUTANEOUS AT BEDTIME
Refills: 0 | Status: DISCONTINUED | OUTPATIENT
Start: 2021-05-09 | End: 2021-05-09

## 2021-05-09 RX ORDER — INSULIN GLARGINE 100 [IU]/ML
14 INJECTION, SOLUTION SUBCUTANEOUS AT BEDTIME
Refills: 0 | Status: DISCONTINUED | OUTPATIENT
Start: 2021-05-09 | End: 2021-05-11

## 2021-05-09 RX ORDER — INSULIN LISPRO 100/ML
VIAL (ML) SUBCUTANEOUS
Refills: 0 | Status: DISCONTINUED | OUTPATIENT
Start: 2021-05-09 | End: 2021-05-11

## 2021-05-09 RX ORDER — DEXTROSE 50 % IN WATER 50 %
15 SYRINGE (ML) INTRAVENOUS ONCE
Refills: 0 | Status: DISCONTINUED | OUTPATIENT
Start: 2021-05-09 | End: 2021-05-11

## 2021-05-09 RX ORDER — DEXTROSE 50 % IN WATER 50 %
12.5 SYRINGE (ML) INTRAVENOUS ONCE
Refills: 0 | Status: DISCONTINUED | OUTPATIENT
Start: 2021-05-09 | End: 2021-05-11

## 2021-05-09 RX ORDER — ATORVASTATIN CALCIUM 80 MG/1
40 TABLET, FILM COATED ORAL AT BEDTIME
Refills: 0 | Status: DISCONTINUED | OUTPATIENT
Start: 2021-05-09 | End: 2021-05-11

## 2021-05-09 RX ORDER — INSULIN LISPRO 100/ML
VIAL (ML) SUBCUTANEOUS AT BEDTIME
Refills: 0 | Status: DISCONTINUED | OUTPATIENT
Start: 2021-05-09 | End: 2021-05-11

## 2021-05-09 RX ORDER — INSULIN LISPRO 100/ML
5 VIAL (ML) SUBCUTANEOUS
Refills: 0 | Status: DISCONTINUED | OUTPATIENT
Start: 2021-05-09 | End: 2021-05-09

## 2021-05-09 RX ADMIN — SODIUM CHLORIDE 75 MILLILITER(S): 9 INJECTION INTRAMUSCULAR; INTRAVENOUS; SUBCUTANEOUS at 05:00

## 2021-05-09 RX ADMIN — Medication 1: at 18:36

## 2021-05-09 RX ADMIN — ATORVASTATIN CALCIUM 40 MILLIGRAM(S): 80 TABLET, FILM COATED ORAL at 22:45

## 2021-05-09 RX ADMIN — ENOXAPARIN SODIUM 40 MILLIGRAM(S): 100 INJECTION SUBCUTANEOUS at 13:20

## 2021-05-09 RX ADMIN — INSULIN GLARGINE 14 UNIT(S): 100 INJECTION, SOLUTION SUBCUTANEOUS at 22:45

## 2021-05-09 RX ADMIN — Medication 6 UNIT(S): at 18:37

## 2021-05-09 RX ADMIN — Medication 4: at 13:16

## 2021-05-09 RX ADMIN — Medication 5 UNIT(S): at 09:30

## 2021-05-09 RX ADMIN — Medication 5 UNIT(S): at 13:17

## 2021-05-09 NOTE — CONSULT NOTE ADULT - SUBJECTIVE AND OBJECTIVE BOX
HPI:  69 y/o male with T2DM (uncontrolled, HBA1C 12.8) (not on therapy), HTN, HLD p/w polyuria, polydipsia fatigue.    Reports he was diagnosed with T2DM 3-4 years ago. Was started on metformin 500 mg BID by his PMD but stopped after a week because he didn't want to be on medications. Hasn't seen PMD or any doctor in several years. States he was using free style gideon for a month after diagnosis and then stopped. Does not monitor BG. 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 Admits to being staying in bed most of the day. Has not seen Optho in 3 years, no known retinopathy. Denies numbness/tingling in feet.   States he did not eat anything yesterday in the hospital because wasn't given food per pt. Reports ate breakfast today.         PAST MEDICAL & SURGICAL HISTORY:  Hypertension    Diabetes    Hyperlipemia    History of oral surgery  5 teeth were removed        FAMILY HISTORY:  FH: diabetes mellitus (Grandparents)      Social History:  Denies tobacco use or alcohol abuse      Outpatient Medications: Home Medications:      MEDICATIONS  (STANDING):  dextrose 40% Gel 15 Gram(s) Oral once  dextrose 5%. 1000 milliLiter(s) (50 mL/Hr) IV Continuous <Continuous>  dextrose 5%. 1000 milliLiter(s) (100 mL/Hr) IV Continuous <Continuous>  dextrose 50% Injectable 25 Gram(s) IV Push once  dextrose 50% Injectable 12.5 Gram(s) IV Push once  dextrose 50% Injectable 25 Gram(s) IV Push once  enoxaparin Injectable 40 milliGRAM(s) SubCutaneous daily  glucagon  Injectable 1 milliGRAM(s) IntraMuscular once  insulin glargine Injectable (LANTUS) 14 Unit(s) SubCutaneous at bedtime  insulin lispro (ADMELOG) corrective regimen sliding scale   SubCutaneous three times a day before meals  insulin lispro (ADMELOG) corrective regimen sliding scale   SubCutaneous at bedtime  insulin lispro Injectable (ADMELOG) 5 Unit(s) SubCutaneous three times a day before meals    MEDICATIONS  (PRN):      Allergies    No Known Allergies    Intolerances      Review of Systems:  Constitutional: No fever, chills   Neuro: No tremors, headache   Cardiovascular: No chest pain, palpitations  Respiratory: No SOB, no cough  GI: No nausea, vomiting, abdominal pain  : No dysuria, polyuria   Skin: no rash, ulcers   Psych: no depression, anxiety   Endocrine: no polyphagia, polydipsia     ALL OTHER SYSTEMS REVIEWED AND NEGATIVE        PHYSICAL EXAM:  VITALS: T(C): 36.8 (05-09-21 @ 06:25)  T(F): 98.2 (05-09-21 @ 06:25), Max: 98.2 (05-08-21 @ 23:00)  HR: 72 (05-09-21 @ 06:25) (70 - 96)  BP: 124/70 (05-09-21 @ 06:25) (118/71 - 156/92)  RR:  (16 - 19)  SpO2:  (98% - 100%)  Wt(kg): --  GENERAL: NAD, well-groomed, well-developed  EYES: No proptosis, anicteric  HEENT:  Atraumatic, Normocephalic, moist mucous membranes  THYROID: Normal size, no palpable nodules  RESPIRATORY: Clear to auscultation bilaterally; No rales, rhonchi, wheezing  CARDIOVASCULAR: Regular rate and rhythm; No murmurs  GI: Soft, nontender, non distended, normal bowel sounds  SKIN: Dry, intact, No rashes or lesions  NEURO: AOx3, moves all extremities spontaneously   PSYCH: Reactive affect, euthymic mood    POCT Blood Glucose.: 144 mg/dL (05-09-21 @ 08:41)  POCT Blood Glucose.: 240 mg/dL (05-08-21 @ 23:10)  POCT Blood Glucose.: 228 mg/dL (05-08-21 @ 21:42)  POCT Blood Glucose.: 301 mg/dL (05-08-21 @ 15:49)  POCT Blood Glucose.: 525 mg/dL (05-08-21 @ 14:03)  POCT Blood Glucose.: 535 mg/dL (05-08-21 @ 12:20)                            12.6   6.85  )-----------( 193      ( 09 May 2021 07:14 )             35.0       05-09    140  |  107  |  6<L>  ----------------------------<  179<H>  3.7   |  22  |  0.68    EGFR if : 113  EGFR if non : 97    Ca    8.4      05-09  Mg     2.0     05-09  Phos  2.8     05-09    TPro  7.5  /  Alb  4.1  /  TBili  1.7<H>  /  DBili  x   /  AST  14  /  ALT  5   /  AlkPhos  2829<H>  05-08      Thyroid Function Tests:              Radiology:                HPI:  71 y/o male with T2DM (uncontrolled, HBA1C 12.8) (not on therapy), HTN, HLD p/w polyuria, polydipsia fatigue.    Reports he was diagnosed with T2DM 3-4 years ago. Was started on metformin 500 mg BID by his PMD but stopped after a week because he didn't want to be on medications. Hasn't seen PMD or any doctor in several years. States he was using free style gideon for a month after diagnosis and then stopped. Does not monitor BG. 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 Admits to being staying in bed most of the day. Has not seen Optho in 3 years, no known retinopathy. Denies numbness/tingling in feet.   States he did not eat anything yesterday in the hospital because wasn't given food per pt. Reports ate breakfast today.         PAST MEDICAL & SURGICAL HISTORY:  Hypertension    Diabetes    Hyperlipemia    History of oral surgery  5 teeth were removed        FAMILY HISTORY:  FH: diabetes mellitus (Grandparents)      Social History:  Denies tobacco use or alcohol abuse      Outpatient Medications: Home Medications:      MEDICATIONS  (STANDING):  dextrose 40% Gel 15 Gram(s) Oral once  dextrose 5%. 1000 milliLiter(s) (50 mL/Hr) IV Continuous <Continuous>  dextrose 5%. 1000 milliLiter(s) (100 mL/Hr) IV Continuous <Continuous>  dextrose 50% Injectable 25 Gram(s) IV Push once  dextrose 50% Injectable 12.5 Gram(s) IV Push once  dextrose 50% Injectable 25 Gram(s) IV Push once  enoxaparin Injectable 40 milliGRAM(s) SubCutaneous daily  glucagon  Injectable 1 milliGRAM(s) IntraMuscular once  insulin glargine Injectable (LANTUS) 14 Unit(s) SubCutaneous at bedtime  insulin lispro (ADMELOG) corrective regimen sliding scale   SubCutaneous three times a day before meals  insulin lispro (ADMELOG) corrective regimen sliding scale   SubCutaneous at bedtime  insulin lispro Injectable (ADMELOG) 5 Unit(s) SubCutaneous three times a day before meals    MEDICATIONS  (PRN):      Allergies    No Known Allergies    Intolerances      Review of Systems:  Constitutional: No fever, chills   Neuro: No tremors, headache   Cardiovascular: No chest pain, palpitations  Respiratory: No SOB, no cough  GI: No nausea, vomiting, abdominal pain  : No dysuria, polyuria   Skin: no rash, ulcers   Psych: no depression, anxiety   Endocrine: no polyphagia, polydipsia     ALL OTHER SYSTEMS REVIEWED AND NEGATIVE        PHYSICAL EXAM:  VITALS: T(C): 36.8 (05-09-21 @ 06:25)  T(F): 98.2 (05-09-21 @ 06:25), Max: 98.2 (05-08-21 @ 23:00)  HR: 72 (05-09-21 @ 06:25) (70 - 96)  BP: 124/70 (05-09-21 @ 06:25) (118/71 - 156/92)  RR:  (16 - 19)  SpO2:  (98% - 100%)  Wt(kg): --  GENERAL: NAD, well-groomed, well-developed  EYES: No proptosis, anicteric  HEENT:  Atraumatic, Normocephalic, moist mucous membranes  THYROID: Normal size, no palpable nodules  RESPIRATORY: Clear to auscultation bilaterally; No rales, rhonchi, wheezing  CARDIOVASCULAR: Regular rate and rhythm; No murmurs  GI: Soft, nontender, non distended, normal bowel sounds  SKIN: Dry, intact, No rashes or lesions  NEURO: AOx3, moves all extremities spontaneously   PSYCH: Reactive affect, euthymic mood    POCT Blood Glucose.: 144 mg/dL (05-09-21 @ 08:41)  POCT Blood Glucose.: 240 mg/dL (05-08-21 @ 23:10)  POCT Blood Glucose.: 228 mg/dL (05-08-21 @ 21:42)  POCT Blood Glucose.: 301 mg/dL (05-08-21 @ 15:49)  POCT Blood Glucose.: 525 mg/dL (05-08-21 @ 14:03)  POCT Blood Glucose.: 535 mg/dL (05-08-21 @ 12:20)                            12.6   6.85  )-----------( 193      ( 09 May 2021 07:14 )             35.0       05-09    140  |  107  |  6<L>  ----------------------------<  179<H>  3.7   |  22  |  0.68    EGFR if : 113  EGFR if non : 97    Ca    8.4      05-09  Mg     2.0     05-09  Phos  2.8     05-09    TPro  7.5  /  Alb  4.1  /  TBili  1.7<H>  /  DBili  x   /  AST  14  /  ALT  5   /  AlkPhos  2829<H>  05-08                  Radiology:   EXAM:  CT ANGIO ABD PELV (W)AW IC      EXAM:  CT ANGIO CHEST (W)AW IC        PROCEDURE DATE:  May  8 2021         INTERPRETATION:  CLINICAL INFORMATION: Rising lactate and arm pain, evaluate for dissection.    COMPARISON: None.    CONTRAST/COMPLICATIONS:  IV Contrast: Omnipaque 350  90 cc administered   10 cc discarded  Oral Contrast: NONE  Complications: None reported at time of study completion    PROCEDURE:  CT Angiography of the Chest, Abdomen and Pelvis.  Precontrast imaging was performed through the chest followed by arterial phase imaging of the chest, abdomen and pelvis.  Sagittal and coronal reformats were performed as well as 3D (MIP) reconstructions.    FINDINGS:  CHEST:  LUNGS AND LARGE AIRWAYS: Patent central airways. No pulmonary nodules.  PLEURA: No pleural effusion.  VESSELS: Intramural hematoma. No evidence of thoracic aortic dissection. Atherosclerotic calcifications of the coronary vessels.  HEART: Heart size is normal. No pericardial effusion.  MEDIASTINUM AND CORY: No lymphadenopathy.  CHEST WALL AND LOWER NECK: Within normal limits.    ABDOMEN AND PELVIS:  LIVER: Within normal limits.  BILE DUCTS: Normal caliber.  GALLBLADDER: Within normal limits.  SPLEEN: Within normal limits.  PANCREAS: Within normal limits.  ADRENALS: Within normal limits.  KIDNEYS/URETERS: Horseshoe kidney. No renal stones or hydronephrosis. Bilateral renal cysts, measuring up to 3.8 cm in the lower pole of the right moiety and 3.8 cm in the upper pole of the left moiety.    BLADDER: Within normal limits.  REPRODUCTIVE ORGANS: Prostate is enlarged.    BOWEL: No bowel obstruction. Appendix is normal.  PERITONEUM: No ascites.  VESSELS: No abdominal aortic dissection.  RETROPERITONEUM/LYMPH NODES: No lymphadenopathy.  ABDOMINAL WALL: Within normal limits.  BONES: There is cortical thickening and sclerosis of the right clavicle. There is also cortical thickening and prominent trabecula in the bilateral iliac bones extending to the pubic symphysis and ischiums. The L1 vertebral bodyis also affected. The appearance is suggestive of Paget's disease however other etiologies may be responsible. Correlate clinically.      IMPRESSION:    No thoracic or abdominal aortic dissection.    There is cortical thickening and sclerosis of the right clavicle. There is also cortical thickening and prominent trabecula in the bilateral iliac bones extending to the pubic symphysis and ischiums. The L1 vertebral body is also affected. The appearance is suggestive of Paget's disease however otheretiologies may be responsible. Correlate clinically.    Horseshoe kidney.                 HPI:  71 y/o male with T2DM (uncontrolled, HBA1C 12.8) (not on therapy), HTN, HLD p/w polyuria, polydipsia fatigue.    Reports he was diagnosed with T2DM 3-4 years ago. Was started on metformin 500 mg BID by his PMD but stopped after a week because he didn't want to be on medications. Hasn't seen PMD or any doctor in several years. States he was using free style gideon for a month after diagnosis and then stopped. Does not monitor BG. 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 Admits to being staying in bed most of the day. Has not seen Optho in 3 years, no known retinopathy. Denies numbness/tingling in feet. States he did not eat anything yesterday in the hospital because wasn't given food per pt. Reports ate breakfast today. Has lost 30 lbs.    Also found to have labs and imaging suggestive of Pagets disease - alk phos here 2898, severely elevated, and CT chest/abdomen/pelvis reveals likely involvement of vertebral body, right clavicle and pelvis. He denies prior history of fractures.       PAST MEDICAL & SURGICAL HISTORY:  Hypertension    DM2    Hyperlipemia    History of oral surgery  5 teeth were removed      FAMILY HISTORY:  FH: diabetes mellitus (Grandparents)      Social History:  Denies tobacco use or alcohol abuse    lives with wife       Outpatient Medications: Home Medications:  no medications for DM    MEDICATIONS  (STANDING):  dextrose 40% Gel 15 Gram(s) Oral once  dextrose 5%. 1000 milliLiter(s) (50 mL/Hr) IV Continuous <Continuous>  dextrose 5%. 1000 milliLiter(s) (100 mL/Hr) IV Continuous <Continuous>  dextrose 50% Injectable 25 Gram(s) IV Push once  dextrose 50% Injectable 12.5 Gram(s) IV Push once  dextrose 50% Injectable 25 Gram(s) IV Push once  enoxaparin Injectable 40 milliGRAM(s) SubCutaneous daily  glucagon  Injectable 1 milliGRAM(s) IntraMuscular once  insulin glargine Injectable (LANTUS) 14 Unit(s) SubCutaneous at bedtime  insulin lispro (ADMELOG) corrective regimen sliding scale   SubCutaneous three times a day before meals  insulin lispro (ADMELOG) corrective regimen sliding scale   SubCutaneous at bedtime  insulin lispro Injectable (ADMELOG) 5 Unit(s) SubCutaneous three times a day before meals    MEDICATIONS  (PRN):      Allergies  No Known Allergies    Review of Systems:  Constitutional: No fever, chills   Neuro: No tremors, headache   Cardiovascular: No chest pain, palpitations  Respiratory: No SOB, no cough  GI: No nausea, vomiting, abdominal pain  : No dysuria, polyuria   Skin: no rash, ulcers   Psych: no depression, anxiety   Endocrine: no polyphagia, polydipsia     ALL OTHER SYSTEMS REVIEWED AND NEGATIVE    PHYSICAL EXAM:  VITALS: T(C): 36.8 (05-09-21 @ 06:25)  T(F): 98.2 (05-09-21 @ 06:25), Max: 98.2 (05-08-21 @ 23:00)  HR: 72 (05-09-21 @ 06:25) (70 - 96)  BP: 124/70 (05-09-21 @ 06:25) (118/71 - 156/92)  RR:  (16 - 19)  SpO2:  (98% - 100%)  Wt(kg): --  GENERAL: NAD, well-groomed, well-developed  EYES: No proptosis, anicteric  HEENT:  Atraumatic, Normocephalic, moist mucous membranes  THYROID: Normal size, no palpable nodules  RESPIRATORY: Clear to auscultation bilaterally; No rales, rhonchi, wheezing  CARDIOVASCULAR: Regular rate and rhythm; No murmurs  GI: Soft, nontender, non distended, normal bowel sounds  SKIN: Dry, intact, No rashes or lesions  NEURO: AOx3, moves all extremities spontaneously   PSYCH: Reactive affect, euthymic mood    POCT Blood Glucose.: 144 mg/dL (05-09-21 @ 08:41)  POCT Blood Glucose.: 240 mg/dL (05-08-21 @ 23:10)  POCT Blood Glucose.: 228 mg/dL (05-08-21 @ 21:42)  POCT Blood Glucose.: 301 mg/dL (05-08-21 @ 15:49)  POCT Blood Glucose.: 525 mg/dL (05-08-21 @ 14:03)  POCT Blood Glucose.: 535 mg/dL (05-08-21 @ 12:20)                            12.6   6.85  )-----------( 193      ( 09 May 2021 07:14 )             35.0       05-09    140  |  107  |  6<L>  ----------------------------<  179<H>  3.7   |  22  |  0.68    EGFR if : 113  EGFR if non : 97    Ca    8.4      05-09  Mg     2.0     05-09  Phos  2.8     05-09    TPro  7.5  /  Alb  4.1  /  TBili  1.7<H>  /  DBili  x   /  AST  14  /  ALT  5   /  AlkPhos  2829<H>  05-08                  Radiology:   EXAM:  CT ANGIO ABD PELV (W)AW IC      EXAM:  CT ANGIO CHEST (W)AW IC        PROCEDURE DATE:  May  8 2021         INTERPRETATION:  CLINICAL INFORMATION: Rising lactate and arm pain, evaluate for dissection.    COMPARISON: None.    CONTRAST/COMPLICATIONS:  IV Contrast: Omnipaque 350  90 cc administered   10 cc discarded  Oral Contrast: NONE  Complications: None reported at time of study completion    PROCEDURE:  CT Angiography of the Chest, Abdomen and Pelvis.  Precontrast imaging was performed through the chest followed by arterial phase imaging of the chest, abdomen and pelvis.  Sagittal and coronal reformats were performed as well as 3D (MIP) reconstructions.    FINDINGS:  CHEST:  LUNGS AND LARGE AIRWAYS: Patent central airways. No pulmonary nodules.  PLEURA: No pleural effusion.  VESSELS: Intramural hematoma. No evidence of thoracic aortic dissection. Atherosclerotic calcifications of the coronary vessels.  HEART: Heart size is normal. No pericardial effusion.  MEDIASTINUM AND CORY: No lymphadenopathy.  CHEST WALL AND LOWER NECK: Within normal limits.    ABDOMEN AND PELVIS:  LIVER: Within normal limits.  BILE DUCTS: Normal caliber.  GALLBLADDER: Within normal limits.  SPLEEN: Within normal limits.  PANCREAS: Within normal limits.  ADRENALS: Within normal limits.  KIDNEYS/URETERS: Horseshoe kidney. No renal stones or hydronephrosis. Bilateral renal cysts, measuring up to 3.8 cm in the lower pole of the right moiety and 3.8 cm in the upper pole of the left moiety.    BLADDER: Within normal limits.  REPRODUCTIVE ORGANS: Prostate is enlarged.    BOWEL: No bowel obstruction. Appendix is normal.  PERITONEUM: No ascites.  VESSELS: No abdominal aortic dissection.  RETROPERITONEUM/LYMPH NODES: No lymphadenopathy.  ABDOMINAL WALL: Within normal limits.  BONES: There is cortical thickening and sclerosis of the right clavicle. There is also cortical thickening and prominent trabecula in the bilateral iliac bones extending to the pubic symphysis and ischiums. The L1 vertebral bodyis also affected. The appearance is suggestive of Paget's disease however other etiologies may be responsible. Correlate clinically.      IMPRESSION:    No thoracic or abdominal aortic dissection.    There is cortical thickening and sclerosis of the right clavicle. There is also cortical thickening and prominent trabecula in the bilateral iliac bones extending to the pubic symphysis and ischiums. The L1 vertebral body is also affected. The appearance is suggestive of Paget's disease however otheretiologies may be responsible. Correlate clinically.    Horseshoe kidney.

## 2021-05-09 NOTE — PROGRESS NOTE ADULT - PROBLEM SELECTOR PLAN 1
Initial symptom of weakness as per patient, found to be hyperglycemic with BS: 535, acidotic (7.32 downtrending to 7.28), however HCO3 normal, U/A negative for ketones, positive for glucose, AG: 15 to 12, and with normal mental status. A1c: 12.8 (5/2021)  S/p 10 U Lantus and 6 U Admelog in ED  Start on 14 U Lantus and 5 U Admelog TID  Start on moderate insulin sliding scale  Can adjust insulin regimen as pt starts on diet  Endocrine consult in AM  Will require Diabetes education and teaching  Maintain inpatient goal of BS: 140-180  Carb-consistent Diet  Monitor fingersticks  Monitor BMP QD Initial symptom of weakness as per patient, found to be hyperglycemic with BS: 535, acidotic (7.32 downtrending to 7.28), however HCO3 normal, U/A negative for ketones, positive for glucose, AG: 15 to 12, and with normal mental status. A1c: 12.8 (5/2021). S/p 10 U Lantus and 6 U Admelog in ED  - c/w 14 U Lantus and 5 U Admelog TID  -c/w moderate ISS  - Will require Diabetes education and teaching  - Appreciate endocrine recs.

## 2021-05-09 NOTE — PROGRESS NOTE ADULT - PROBLEM SELECTOR PLAN 5
Noted to have cortical thickening and sclerosis of right clavicle, pubic symphysis, and ischium.  Findings suggestive of Paget's disease.  Notable alkaline phosphatase of 2829, pt does have b/l shoulder and neck pain  Will benefit from endocrine consult in AM for possibly starting on bisphosphonates Noted to have cortical thickening and sclerosis of right clavicle, pubic symphysis, and ischium. Findings suggestive of Paget's disease. Notable alkaline phosphatase of 2829, pt does have b/l shoulder and neck pain  - f/u endocrine recs for possibly starting on bisphosphonates

## 2021-05-09 NOTE — CONSULT NOTE ADULT - ASSESSMENT
69 y/o male with T2DM (uncontrolled, HBA1C 12.8) (not on therapy), HTN, HLD p/w polyuria, polydipsia fatigue. Endocrine consulted for uncontrolled T2Dm with hyperglycemia.     #uncontrolled T2DM (HBA1C 12.8) with hyperglycemia. FBG at goal this morning with Lantus 10 units last night.  - goal glucose 100-180  - continue Lantus 10 units qhs for now  - Admelog __ units TID pre-meal  - change scales to low pre-meal correction scale and low HS correction scale  - check FS AC/HS  - carb consistent diet  - **registered dietician eval  Discharge  - Plan TBD based on insulin requirement at discharge. Current insulin requirement is low. Anticipate DC on basal insulin + metformin.  - f/u with Endocrinologist, Dr. Kessler at [facility], [address] [facility], 2nd floor, [place] [address].   Phone [phone]    #HTN  goal BP<130/80 (at goal)  manage per primary team    #HLD  start statin if no contraindication  fasting lipid profile outpatient      71 y/o male with T2DM (uncontrolled, HBA1C 12.8) (not on therapy), HTN, HLD p/w polyuria, polydipsia fatigue. Endocrine consulted for uncontrolled T2Dm with hyperglycemia.     #uncontrolled DM (HBA1C 12.8), likely Type 2, r/o LEXII given insulin requirement low and not overweight. With hyperglycemia. FBG at goal this morning with Lantus 10 units last night. Suspect insulin requirement lower than expected overnight due to little PO intake yesterday.   - FRANCISCO-65 antibody, Islet cell antibody, zinc transporter, C-peptide  - goal glucose 100-180  - agree with Lantus 14 units qhs (pt is eating more now so suspect insulin requirement will be higher now)  - increase Admelog to 6 units TID pre-meal  - change scales to low pre-meal correction scale and low HS correction scale  - check FS AC/HS  - carb consistent diet  - **registered dietician eval  - insulin reaching prior to DC  Discharge  - Plan TBD based on insulin requirement at discharge. Current insulin requirement is low. Anticipate DC on basal insulin + metformin +/- additional oral agent such as DPP4i.  - f/u with Endocrinologist, Dr. Kessler at [facility], [address] [facility], 2nd floor, [place] [address].   Phone [phone]    #HTN  goal BP<130/80 (at goal)  manage per primary team    #HLD  start statin if no contraindication  fasting lipid profile outpatient     #elevated Alkaline phosphatase. ALP markedly elevated at 2829 with incidental findings suggestive of Paget's disease at multiple sites including pelvis on CT. Given markedly elevated ALP with findings c/f paget's in site that is high risk for fracture (pelvis), treatment for Paget's is indicated    DW Team    Raven Hahn DO, Endocrine Fellow   Pager [phone] from 9-5PM. After hours and on weekends please call [phone].       69 y/o male with T2DM (uncontrolled, HBA1C 12.8) (not on therapy), HTN, HLD p/w polyuria, polydipsia fatigue. Endocrine consulted for uncontrolled T2Dm with hyperglycemia.     #uncontrolled DM (HBA1C 12.8), likely Type 2, r/o LEXII given insulin requirement low and not overweight. With hyperglycemia. FBG at goal this morning with Lantus 10 units last night. Suspect insulin requirement lower than expected overnight due to little PO intake yesterday.   - FRANCISCO-65 antibody, Islet cell antibody, zinc transporter, C-peptide  - goal glucose 100-180  - agree with Lantus 14 units qhs (pt is eating more now so suspect insulin requirement will be higher now)  - increase Admelog to 6 units TID pre-meal  - change scales to low pre-meal correction scale and low HS correction scale  - check FS AC/HS  - carb consistent diet  - **registered dietician eval  - insulin reaching prior to DC  Discharge  - Plan TBD based on insulin requirement at discharge. Current insulin requirement is low. Anticipate DC on basal insulin + metformin +/- additional oral agent such as DPP4i.  - f/u with Endocrinologist, Dr. Kessler at [facility], [address] [facility], 2nd floor, [place] [address].   Phone [phone]    #HTN  goal BP<130/80 (at goal)  manage per primary team    #HLD  start statin if no contraindication  fasting lipid profile outpatient     #elevated Alkaline phosphatase. ALP markedly elevated at 2829 with incidental findings suggestive of Paget's disease at multiple sites including pelvis on CT. Given markedly elevated ALP with findings c/f paget's in site that is high risk for fracture (pelvis), treatment for Paget's is indicated  - recommend XR pelvis and spine for baseline imaging in order to follow progression of disease later on outpatient  - check STAT vitamin D 25-OH level to ensure vitamin D level is adequate prior to dosing bisphosphonate (if vitamin D level low, pt is at higher risk for hypocalcemia post- bisphosphonate therapy)  - please dose Zoledronic acid 5 mg IV - if vitamin D level not resulted by this evening, give STAT one time dose of ergocalciferol 50,000 IU prior to dosing ZA (recommend giving ZA tonight in case pt is discharged by tomorrow). Pt is agreeable to ZA.      DW Team    Raven Hahn DO, Endocrine Fellow   Pager [phone] from 9-5PM. After hours and on weekends please call [phone].       69 y/o male with T2DM (uncontrolled, HBA1C 12.8) (not on therapy), HTN, HLD p/w polyuria, polydipsia fatigue. Endocrine consulted for uncontrolled T2Dm with hyperglycemia, but also found to have Pagets disease     #uncontrolled DM (HBA1C 12.8), likely Type 2, r/o LEXII given insulin requirement low and not overweight. With hyperglycemia. FBG at goal this morning with Lantus 10 units last night. Suspect insulin requirement lower than expected overnight due to little PO intake yesterday.   - check FRANCISCO-65 antibody, Islet cell antibody, zinc transporter, C-peptide  - goal glucose 100-180 mg/dL  - agree with Lantus 14 units qhs (pt is eating more now so suspect insulin requirement will be higher now)  - increase Admelog to 6 units TID pre-meal  - change scales to low pre-meal correction scale and low HS correction scale  - check FS AC/HS  - carb consistent diet  - registered dietician eval  - insulin reaching prior to DC  Discharge  - Anticipate DC on basal insulin + metformin +/- additional oral agent such as DPP4i, which patient is agreeable to.   - f/u with Endocrinologist, Dr. Kessler at [facility], [address] [facility], 2nd floor, [place] [address].   Phone [phone]    #HTN  goal BP<130/80 (at goal)  manage per primary team    #HLD  start statin if no contraindication  fasting lipid profile outpatient     #Paget's disease. ALP markedly elevated at 2829 with incidental findings suggestive of Paget's disease at multiple sites including pelvis on CT. Given markedly elevated ALP with findings c/f paget's in site that is high risk for fracture (spine/pelvis), treatment for Paget's is indicated  - recommend XR pelvis and spine for baseline imaging in order to follow progression of disease later on as outpatient  - check STAT vitamin D 25-OH level to ensure vitamin D level is adequate prior to dosing bisphosphonate (if vitamin D level low, pt is at higher risk for hypocalcemia post- bisphosphonate therapy)  - please dose Zoledronic acid 5 mg IV - if vitamin D level not available/resulted by this evening, can give STAT one time dose of ergocalciferol 50,000 IU prior to dosing ZA (recommend giving ZA tonight in case pt is discharged by tomorrow). Pt is agreeable to ZA.      DW Team    Raven Hahn DO, Endocrine Fellow   Pager [phone] from 9-5PM. After hours and on weekends please call [phone].

## 2021-05-09 NOTE — PROGRESS NOTE ADULT - SUBJECTIVE AND OBJECTIVE BOX
PROGRESS NOTE:   Authored by Carola Purvis MD  Pager: [facility] [phone]; [facility] [phone]    Patient is a 70y old  Male who presents with a chief complaint of Weakness (08 May 2021 22:16)      SUBJECTIVE / OVERNIGHT EVENTS:    ADDITIONAL REVIEW OF SYSTEMS:    MEDICATIONS  (STANDING):  dextrose 40% Gel 15 Gram(s) Oral once  dextrose 5%. 1000 milliLiter(s) (50 mL/Hr) IV Continuous <Continuous>  dextrose 5%. 1000 milliLiter(s) (100 mL/Hr) IV Continuous <Continuous>  dextrose 50% Injectable 25 Gram(s) IV Push once  dextrose 50% Injectable 12.5 Gram(s) IV Push once  dextrose 50% Injectable 25 Gram(s) IV Push once  enoxaparin Injectable 40 milliGRAM(s) SubCutaneous daily  glucagon  Injectable 1 milliGRAM(s) IntraMuscular once  insulin glargine Injectable (LANTUS) 14 Unit(s) SubCutaneous at bedtime  insulin lispro (ADMELOG) corrective regimen sliding scale   SubCutaneous three times a day before meals  insulin lispro (ADMELOG) corrective regimen sliding scale   SubCutaneous at bedtime  insulin lispro Injectable (ADMELOG) 5 Unit(s) SubCutaneous three times a day before meals  sodium chloride 0.9%. 1000 milliLiter(s) (75 mL/Hr) IV Continuous <Continuous>    MEDICATIONS  (PRN):      CAPILLARY BLOOD GLUCOSE      POCT Blood Glucose.: 240 mg/dL (08 May 2021 23:10)  POCT Blood Glucose.: 228 mg/dL (08 May 2021 21:42)  POCT Blood Glucose.: 301 mg/dL (08 May 2021 15:49)  POCT Blood Glucose.: 525 mg/dL (08 May 2021 14:03)  POCT Blood Glucose.: 535 mg/dL (08 May 2021 12:20)    I&O's Summary    08 May 2021 07:01  -  09 May 2021 07:00  --------------------------------------------------------  IN: 1400 mL / OUT: 1150 mL / NET: 250 mL        PHYSICAL EXAM:  Vital Signs Last 24 Hrs  T(C): 36.8 (09 May 2021 06:25), Max: 36.8 (08 May 2021 23:00)  T(F): 98.2 (09 May 2021 06:25), Max: 98.2 (08 May 2021 23:00)  HR: 72 (09 May 2021 06:25) (70 - 96)  BP: 124/70 (09 May 2021 06:25) (118/71 - 156/92)  BP(mean): --  RR: 19 (09 May 2021 06:25) (16 - 19)  SpO2: 99% (09 May 2021 06:25) (98% - 100%)    GENERAL: NAD, well-developed, AAOx4  HEAD:  Atraumatic, Normocephalic  EYES: EOMI, PERRLA, conjunctiva and sclera clear  NECK: Supple, No JVD  LUNG: Clear to auscultation bilaterally; No wheezes or crackles, no tachypnea  HEART: Regular rate and rhythm; No murmurs, rubs, or gallops, no edeme  ABDOMEN: Soft, Nontender, Nondistended; Bowel sounds present  EXTREMITIES:  2+ Peripheral Pulses, No clubbing, cyanosis, or edema  NEUROLOGY: non-focal deficits, sensation intact to pinprick  SKIN: No rashes or lesions  Psych: Not depressed or anxious.    LABS:                        12.6   6.85  )-----------( 193      ( 09 May 2021 07:14 )             35.0     05-08    133<L>  |  98  |  9   ----------------------------<  578<HH>  4.7   |  23  |  0.91    Ca    8.1<L>      08 May 2021 14:38    TPro  7.5  /  Alb  4.1  /  TBili  1.7<H>  /  DBili  x   /  AST  14  /  ALT  5   /  AlkPhos  2829<H>  05-08      CARDIAC MARKERS ( 08 May 2021 14:42 )  x     / x     / 43 U/L / x     / x          Urinalysis Basic - ( 08 May 2021 13:44 )    Color: Light Yellow / Appearance: Clear / S.038 / pH: x  Gluc: x / Ketone: Negative  / Bili: Negative / Urobili: <2 mg/dL   Blood: x / Protein: Negative / Nitrite: Negative   Leuk Esterase: Negative / RBC: x / WBC x   Sq Epi: x / Non Sq Epi: x / Bacteria: x          RADIOLOGY & ADDITIONAL TESTS:  Results Reviewed:   Imaging Personally Reviewed:  Electrocardiogram Personally Reviewed:    COORDINATION OF CARE:  Care Discussed with Consultants/Other Providers [Y/N]:  Prior or Outpatient Records Reviewed [Y/N]:   PROGRESS NOTE:   Authored by Carola Purvis MD  Pager: [facility] [phone]; [facility] [phone]    Patient is a 70y old  Male who presents with a chief complaint of Weakness (08 May 2021 22:16)      SUBJECTIVE / OVERNIGHT EVENTS:  This AM, patient AXoX3 at bedside. States that his wife made him come into the ED after she stated that he was acting "confused". Patient also endorses recently increased urinary frequency. No dysuria or hematuria. Patient states he had been diagnosed with diabetes prior, but has not been compliant with his home metformin. States that he "does not like taking medications". Denies CP, SOB, subjective f/c, n/v.     MEDICATIONS  (STANDING):  dextrose 40% Gel 15 Gram(s) Oral once  dextrose 5%. 1000 milliLiter(s) (50 mL/Hr) IV Continuous <Continuous>  dextrose 5%. 1000 milliLiter(s) (100 mL/Hr) IV Continuous <Continuous>  dextrose 50% Injectable 25 Gram(s) IV Push once  dextrose 50% Injectable 12.5 Gram(s) IV Push once  dextrose 50% Injectable 25 Gram(s) IV Push once  enoxaparin Injectable 40 milliGRAM(s) SubCutaneous daily  glucagon  Injectable 1 milliGRAM(s) IntraMuscular once  insulin glargine Injectable (LANTUS) 14 Unit(s) SubCutaneous at bedtime  insulin lispro (ADMELOG) corrective regimen sliding scale   SubCutaneous three times a day before meals  insulin lispro (ADMELOG) corrective regimen sliding scale   SubCutaneous at bedtime  insulin lispro Injectable (ADMELOG) 5 Unit(s) SubCutaneous three times a day before meals  sodium chloride 0.9%. 1000 milliLiter(s) (75 mL/Hr) IV Continuous <Continuous>    MEDICATIONS  (PRN):      CAPILLARY BLOOD GLUCOSE      POCT Blood Glucose.: 240 mg/dL (08 May 2021 23:10)  POCT Blood Glucose.: 228 mg/dL (08 May 2021 21:42)  POCT Blood Glucose.: 301 mg/dL (08 May 2021 15:49)  POCT Blood Glucose.: 525 mg/dL (08 May 2021 14:03)  POCT Blood Glucose.: 535 mg/dL (08 May 2021 12:20)    I&O's Summary    08 May 2021 07:01  -  09 May 2021 07:00  --------------------------------------------------------  IN: 1400 mL / OUT: 1150 mL / NET: 250 mL        PHYSICAL EXAM:  Vital Signs Last 24 Hrs  T(C): 36.8 (09 May 2021 06:25), Max: 36.8 (08 May 2021 23:00)  T(F): 98.2 (09 May 2021 06:25), Max: 98.2 (08 May 2021 23:00)  HR: 72 (09 May 2021 06:25) (70 - 96)  BP: 124/70 (09 May 2021 06:25) (118/71 - 156/92)  BP(mean): --  RR: 19 (09 May 2021 06:25) (16 - 19)  SpO2: 99% (09 May 2021 06:25) (98% - 100%)    GENERAL: NAD, well-developed, AAOx4  HEAD:  Atraumatic, Normocephalic  EYES: EOMI, PERRLA, conjunctiva and sclera clear  NECK: Supple, No JVD  LUNG: Clear to auscultation bilaterally; No wheezes or crackles, no tachypnea  HEART: Regular rate and rhythm; No murmurs, rubs, or gallops, no edeme  ABDOMEN: Soft, Nontender, Nondistended; Bowel sounds present  EXTREMITIES:  2+ Peripheral Pulses, No clubbing, cyanosis, or edema  NEUROLOGY: non-focal deficits, sensation intact to pinprick  SKIN: No rashes or lesions  Psych: Not depressed or anxious.    LABS:                        12.6   6.85  )-----------( 193      ( 09 May 2021 07:14 )             35.0     05-08    133<L>  |  98  |  9   ----------------------------<  578<HH>  4.7   |  23  |  0.91    Ca    8.1<L>      08 May 2021 14:38    TPro  7.5  /  Alb  4.1  /  TBili  1.7<H>  /  DBili  x   /  AST  14  /  ALT  5   /  AlkPhos  2829<H>  05-08      CARDIAC MARKERS ( 08 May 2021 14:42 )  x     / x     / 43 U/L / x     / x          Urinalysis Basic - ( 08 May 2021 13:44 )    Color: Light Yellow / Appearance: Clear / S.038 / pH: x  Gluc: x / Ketone: Negative  / Bili: Negative / Urobili: <2 mg/dL   Blood: x / Protein: Negative / Nitrite: Negative   Leuk Esterase: Negative / RBC: x / WBC x   Sq Epi: x / Non Sq Epi: x / Bacteria: x          RADIOLOGY & ADDITIONAL TESTS:  Results Reviewed:   Imaging Personally Reviewed:  Electrocardiogram Personally Reviewed:    COORDINATION OF CARE:  Care Discussed with Consultants/Other Providers [Y/N]:  Prior or Outpatient Records Reviewed [Y/N]:

## 2021-05-09 NOTE — PROGRESS NOTE ADULT - PROBLEM SELECTOR PLAN 7
Hx of HLD  Will order lipid panel in AM  Consider starting pt on a statin Hx of HLD  - f/u lipid panel.   - c/w lipitor 40mg QHS

## 2021-05-09 NOTE — PROGRESS NOTE ADULT - PROBLEM SELECTOR PLAN 2
Noted to have pH: 7.32 downtrending to 7.28, with elevated lactate of 3.8 uptrended to 4.7, mild anion gap of 15 downtrended to 12  S/p 2L NS bolus in ED  Most likely Type B lactic acidosis 2/2 to the patient's hyperglycemia  Continue on NS IVF, reassess volume status in AM  Will check serum lactate in AM Noted to have pH: 7.32 downtrending to 7.28, with elevated lactate of 3.8 uptrended to 4.7, mild anion gap of 15 downtrended to 12. S/p 2L NS bolus in ED. BHB 0.2. No ketones in urine.   -Most likely Type B lactic acidosis 2/2 to the patient's hyperglycemia  - Lactate now wnl at 1.8  - RESOLVED.

## 2021-05-09 NOTE — CONSULT NOTE ADULT - ATTENDING COMMENTS
Patient seen at bedside and case discussed with Dr. Hahn. Agree with plan as outlined above. Spoke with patient extensively at the bedside, we discussed risk of microvascular and macrovascular complications from uncontrolled DM2. His great aunt went blind from DM and he does not want that to happen to him. 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 He is agreeable to taking oral agents at home with basal insulin.    He denies prior history of fracture. Severely elevated alk phos and imaging findings are highly suggestive of Paget's disease. Given elevated alk phos and location of disease (at high risk fracture sites), treatment is indicated - recommend administer dose of 5 mg IV Zometa after checking and repleting any vitamin D insufficiency/deficiency. If vitamin D 25 is not available, can give vitamin D 50,000 units x 1 prior to administering Zometa.    Also suggest obtaining plain films of spine and hips/pelvis so that Pagets can be radiographically monitored in the future.    Zenobia Fabian MD   On evenings and weekends, please call the office at [phone] or page endocrine fellow on call. Please note that this patient may be followed by different provider tomorrow. If no answer, contact the office.

## 2021-05-10 LAB
24R-OH-CALCIDIOL SERPL-MCNC: 6.5 NG/ML — LOW (ref 30–80)
ALBUMIN SERPL ELPH-MCNC: 3.1 G/DL — LOW (ref 3.3–5)
ALP SERPL-CCNC: 1987 U/L — HIGH (ref 40–120)
ALT FLD-CCNC: 6 U/L — SIGNIFICANT CHANGE UP (ref 4–41)
ANION GAP SERPL CALC-SCNC: 13 MMOL/L — SIGNIFICANT CHANGE UP (ref 7–14)
AST SERPL-CCNC: 16 U/L — SIGNIFICANT CHANGE UP (ref 4–40)
BILIRUB SERPL-MCNC: 1.4 MG/DL — HIGH (ref 0.2–1.2)
BUN SERPL-MCNC: 8 MG/DL — SIGNIFICANT CHANGE UP (ref 7–23)
C PEPTIDE SERPL-MCNC: 0.4 NG/ML — LOW (ref 1.1–4.4)
CALCIUM SERPL-MCNC: 8.3 MG/DL — LOW (ref 8.4–10.5)
CHLORIDE SERPL-SCNC: 107 MMOL/L — SIGNIFICANT CHANGE UP (ref 98–107)
CHOLEST SERPL-MCNC: 163 MG/DL — SIGNIFICANT CHANGE UP
CO2 SERPL-SCNC: 20 MMOL/L — LOW (ref 22–31)
CREAT SERPL-MCNC: 0.76 MG/DL — SIGNIFICANT CHANGE UP (ref 0.5–1.3)
GLUCOSE BLDC GLUCOMTR-MCNC: 104 MG/DL — HIGH (ref 70–99)
GLUCOSE SERPL-MCNC: 84 MG/DL — SIGNIFICANT CHANGE UP (ref 70–99)
HCT VFR BLD CALC: 34.6 % — LOW (ref 39–50)
HDLC SERPL-MCNC: 50 MG/DL — SIGNIFICANT CHANGE UP
HGB BLD-MCNC: 11.7 G/DL — LOW (ref 13–17)
LIPID PNL WITH DIRECT LDL SERPL: 101 MG/DL — HIGH
MAGNESIUM SERPL-MCNC: 1.8 MG/DL — SIGNIFICANT CHANGE UP (ref 1.6–2.6)
MCHC RBC-ENTMCNC: 28.3 PG — SIGNIFICANT CHANGE UP (ref 27–34)
MCHC RBC-ENTMCNC: 33.8 GM/DL — SIGNIFICANT CHANGE UP (ref 32–36)
MCV RBC AUTO: 83.6 FL — SIGNIFICANT CHANGE UP (ref 80–100)
NON HDL CHOLESTEROL: 113 MG/DL — SIGNIFICANT CHANGE UP
NRBC # BLD: 0 /100 WBCS — SIGNIFICANT CHANGE UP
NRBC # FLD: 0 K/UL — SIGNIFICANT CHANGE UP
PHOSPHATE SERPL-MCNC: 3.1 MG/DL — SIGNIFICANT CHANGE UP (ref 2.5–4.5)
PLATELET # BLD AUTO: 180 K/UL — SIGNIFICANT CHANGE UP (ref 150–400)
POTASSIUM SERPL-MCNC: 3.3 MMOL/L — LOW (ref 3.5–5.3)
POTASSIUM SERPL-SCNC: 3.3 MMOL/L — LOW (ref 3.5–5.3)
PROT SERPL-MCNC: 5.9 G/DL — LOW (ref 6–8.3)
PTH-INTACT FLD-MCNC: 65 PG/ML — SIGNIFICANT CHANGE UP (ref 15–65)
RBC # BLD: 4.14 M/UL — LOW (ref 4.2–5.8)
RBC # FLD: 13.1 % — SIGNIFICANT CHANGE UP (ref 10.3–14.5)
SODIUM SERPL-SCNC: 140 MMOL/L — SIGNIFICANT CHANGE UP (ref 135–145)
TRIGL SERPL-MCNC: 62 MG/DL — SIGNIFICANT CHANGE UP
WBC # BLD: 6.5 K/UL — SIGNIFICANT CHANGE UP (ref 3.8–10.5)
WBC # FLD AUTO: 6.5 K/UL — SIGNIFICANT CHANGE UP (ref 3.8–10.5)

## 2021-05-10 PROCEDURE — 99233 SBSQ HOSP IP/OBS HIGH 50: CPT

## 2021-05-10 PROCEDURE — 99232 SBSQ HOSP IP/OBS MODERATE 35: CPT | Mod: GC

## 2021-05-10 RX ORDER — INSULIN ASPART 100 [IU]/ML
6 INJECTION, SOLUTION SUBCUTANEOUS
Qty: 5 | Refills: 0
Start: 2021-05-10 | End: 2021-06-08

## 2021-05-10 RX ORDER — ATORVASTATIN CALCIUM 80 MG/1
1 TABLET, FILM COATED ORAL
Qty: 30 | Refills: 0
Start: 2021-05-10 | End: 2021-06-08

## 2021-05-10 RX ORDER — ERGOCALCIFEROL 1.25 MG/1
50000 CAPSULE ORAL ONCE
Refills: 0 | Status: COMPLETED | OUTPATIENT
Start: 2021-05-10 | End: 2021-05-10

## 2021-05-10 RX ORDER — ERGOCALCIFEROL 1.25 MG/1
1 CAPSULE ORAL
Qty: 13 | Refills: 0
Start: 2021-05-10 | End: 2021-08-07

## 2021-05-10 RX ORDER — ISOPROPYL ALCOHOL, BENZOCAINE .7; .06 ML/ML; ML/ML
1 SWAB TOPICAL
Qty: 100 | Refills: 1
Start: 2021-05-10 | End: 2021-06-28

## 2021-05-10 RX ORDER — ENOXAPARIN SODIUM 100 MG/ML
14 INJECTION SUBCUTANEOUS
Qty: 5 | Refills: 0
Start: 2021-05-10 | End: 2021-06-08

## 2021-05-10 RX ORDER — POTASSIUM CHLORIDE 20 MEQ
40 PACKET (EA) ORAL ONCE
Refills: 0 | Status: COMPLETED | OUTPATIENT
Start: 2021-05-10 | End: 2021-05-10

## 2021-05-10 RX ADMIN — Medication 6 UNIT(S): at 09:41

## 2021-05-10 RX ADMIN — Medication 6 UNIT(S): at 18:31

## 2021-05-10 RX ADMIN — INSULIN GLARGINE 14 UNIT(S): 100 INJECTION, SOLUTION SUBCUTANEOUS at 22:52

## 2021-05-10 RX ADMIN — Medication 40 MILLIEQUIVALENT(S): at 10:53

## 2021-05-10 RX ADMIN — ATORVASTATIN CALCIUM 40 MILLIGRAM(S): 80 TABLET, FILM COATED ORAL at 22:52

## 2021-05-10 RX ADMIN — ENOXAPARIN SODIUM 40 MILLIGRAM(S): 100 INJECTION SUBCUTANEOUS at 13:05

## 2021-05-10 RX ADMIN — Medication 6 UNIT(S): at 13:04

## 2021-05-10 RX ADMIN — ERGOCALCIFEROL 50000 UNIT(S): 1.25 CAPSULE ORAL at 10:53

## 2021-05-10 NOTE — DIETITIAN INITIAL EVALUATION ADULT. - OTHER INFO
Per chart, patient is a 69 y/o male with pmh of HTN, DM, and HLD presents to the ED for increasing fatigue found to have hyperglycemia and lactic acidosis in the setting of uncontrolled diabetes mellitus, also found to have Pagets disease.    Per patient today, appetite has been good prior to admission and current appetite/PO intake remains good at this time, reports PO intake >75% at meals, denies chewing/swallowing difficulties to foods/liquids, denies GI distress (nausea/vomiting/diarrhea/constipation) at this time. Patient reports NKFA, but dislikes seafoods, chicken and turkey -- honored on CBoard. c/w vitamin D supplement per MD order due to low vitamin D level. Noted HgbA1C 12.8% (5/8/21), + hx of DM but non compliant to home med metformin per chart, patient reports he has not been checking his FSBG at home. Food recall reveals high intake of carbohydrate including sodas, juices, potato chips, cookies etc. RDN provided the patient with extensive verbal and written DM diet education, including high carb food list, carb counting, label reading, meal planning w/ timely & consistent meals, pre-prandial and post-prandial finger stick goals, and HbA1c goal. Patient exhibited interest in topics and verbalized understanding.     Reported UBW ~170# and weight had once decreased to 148# (-22#) gradually over the past 4 years, however, current weight increased back to 161#/73kg (5/8/21) due to excessive energy intake and sedentary lifestyle at home. RDN encouraged healthy, balanced diet and increase physical activity.     Skin: No pressure injuries  Edema: none noted.

## 2021-05-10 NOTE — DIETITIAN INITIAL EVALUATION ADULT. - PROBLEM SELECTOR PLAN 1
Initial symptom of weakness as per patient, found to be hyperglycemic with BS: 535, acidotic (7.32 downtrending to 7.28), however HCO3 normal, U/A negative for ketones, positive for glucose, AG: 15 to 12, and with normal mental status. A1c: 12.8 (5/2021)  S/p 10 U Lantus and 6 U Admelog in ED  Start on 14 U Lantus and 5 U Admelog TID  Start on moderate insulin sliding scale  Can adjust insulin regimen as pt starts on diet  Endocrine consult in AM  Will require Diabetes education and teaching  Maintain inpatient goal of BS: 140-180  Carb-consistent Diet  Monitor fingersticks  Monitor BMP QD

## 2021-05-10 NOTE — DIETITIAN INITIAL EVALUATION ADULT. - PROBLEM SELECTOR PLAN 3
NSR, RBBB with repolarization abnormality. No priors to compare  Advised patient to follow up after discharge with a cardiologist for TTE  Trops flat, no CP, probnp normal, euvolemic on exam

## 2021-05-10 NOTE — DIETITIAN INITIAL EVALUATION ADULT. - PERTINENT MEDS FT
MEDICATIONS  (STANDING):  atorvastatin 40 milliGRAM(s) Oral at bedtime  dextrose 40% Gel 15 Gram(s) Oral once  dextrose 5%. 1000 milliLiter(s) (50 mL/Hr) IV Continuous <Continuous>  dextrose 5%. 1000 milliLiter(s) (100 mL/Hr) IV Continuous <Continuous>  dextrose 50% Injectable 25 Gram(s) IV Push once  dextrose 50% Injectable 12.5 Gram(s) IV Push once  dextrose 50% Injectable 25 Gram(s) IV Push once  enoxaparin Injectable 40 milliGRAM(s) SubCutaneous daily  glucagon  Injectable 1 milliGRAM(s) IntraMuscular once  insulin glargine Injectable (LANTUS) 14 Unit(s) SubCutaneous at bedtime  insulin lispro (ADMELOG) corrective regimen sliding scale   SubCutaneous three times a day before meals  insulin lispro (ADMELOG) corrective regimen sliding scale   SubCutaneous at bedtime  insulin lispro Injectable (ADMELOG) 6 Unit(s) SubCutaneous three times a day before meals    MEDICATIONS  (PRN):

## 2021-05-10 NOTE — DIETITIAN INITIAL EVALUATION ADULT. - PROBLEM SELECTOR PLAN 6
BP stable for now  Pt has not been on antihypertensives for years  Will monitor BP, if elevated can start on ACEi or ARB

## 2021-05-10 NOTE — PROGRESS NOTE ADULT - PROBLEM SELECTOR PLAN 1
Initial symptom of weakness as per patient, found to be hyperglycemic with BS: 535, acidotic (7.32 downtrending to 7.28), however HCO3 normal, U/A negative for ketones, positive for glucose, AG: 15 to 12, and with normal mental status. A1c: 12.8 (5/2021). S/p 10 U Lantus and 6 U Admelog in ED  - c/w 14 U Lantus and 6 U Admelog TID  -c/w moderate ISS  - Will require Diabetes education and teaching  - Appreciate endocrine recs. Initial symptom of weakness as per patient, found to be hyperglycemic with BS: 535, acidotic (7.32 downtrending to 7.28), however HCO3 normal, U/A negative for ketones, positive for glucose, AG: 15 to 12, and with normal mental status. A1c: 12.8 (5/2021). S/p 10 U Lantus and 6 U Admelog in ED  - c/w 14 U Lantus and 6 U Admelog TID  -c/w low ISS  - Will require Diabetes education and teaching  - Follws with Dr. Rodriguez of [facility]  - Appreciate endocrine recs.

## 2021-05-10 NOTE — DIETITIAN INITIAL EVALUATION ADULT. - PROBLEM SELECTOR PLAN 9
1. Name of PCP: Dr. Sean Evans  2. PCP contacted on Admission: []Y []N  3. PCP contacted at Discharge: []Y []N  4. Post-Discharge Appointment Date and Location:   5. Summary of Handoff given to PCP:

## 2021-05-10 NOTE — DIETITIAN INITIAL EVALUATION ADULT. - PERTINENT LABORATORY DATA
05-10 Na140 mmol/L Glu 84 mg/dL K+ 3.3 mmol/L<L> Cr  0.76 mg/dL BUN 8 mg/dL 05-10 Phos 3.1 mg/dL 05-10 Alb 3.1 g/dL<L> 05-10 Chol 163 mg/dL LDL --    HDL 50 mg/dL Trig 62 mg/dL

## 2021-05-10 NOTE — PROGRESS NOTE ADULT - PROBLEM SELECTOR PLAN 2
Noted to have cortical thickening and sclerosis of right clavicle, pubic symphysis, and ischium. Findings suggestive of Paget's disease. Notable alkaline phosphatase of 2829, pt does have b/l shoulder and neck pain  -Vitamin D level 6.3 (low).   - To receive zalendronic acid following Vit D repletion  - F/u XRay pelvis and thoracolumbar spine final reads  - Appreciate endocrine recs. Noted to have cortical thickening and sclerosis of right clavicle, pubic symphysis, and ischium. Findings suggestive of Paget's disease. Notable alkaline phosphatase of 2829, pt does have b/l shoulder and neck pain  -Vitamin D level 6.3 (low). s/p 68378 units  - To receive zalendronic acid following Vit D repletion  - F/u XRay pelvis and thoracolumbar spine final reads  - Appreciate endocrine recs.

## 2021-05-10 NOTE — PROGRESS NOTE ADULT - PROBLEM SELECTOR PLAN 5
CTA performed originally for concern for hypoxia, incidental finding of bilateral renal cysts and horseshoe kidney, cysts not noted in any other visceral organs  Currently without any CVA tenderness, fevers, or chills  Monitor kidney function daily  Will need outpatient follow up for monitoring of renal cysts

## 2021-05-10 NOTE — DIETITIAN INITIAL EVALUATION ADULT. - PROBLEM SELECTOR PLAN 2
Noted to have pH: 7.32 downtrending to 7.28, with elevated lactate of 3.8 uptrended to 4.7, mild anion gap of 15 downtrended to 12  S/p 2L NS bolus in ED  Most likely Type B lactic acidosis 2/2 to the patient's hyperglycemia  Continue on NS IVF, reassess volume status in AM  Will check serum lactate in AM

## 2021-05-10 NOTE — PROGRESS NOTE ADULT - SUBJECTIVE AND OBJECTIVE BOX
PROGRESS NOTE:   Authored by Carola Purvis MD  Pager: [facility] [phone]; [facility] [phone]    Patient is a 70y old  Male who presents with a chief complaint of Weakness (09 May 2021 12:02)      SUBJECTIVE / OVERNIGHT EVENTS:   NAEON. This AM, denies CP, SOB, subjective f/c, n/v.     MEDICATIONS  (STANDING):  atorvastatin 40 milliGRAM(s) Oral at bedtime  dextrose 40% Gel 15 Gram(s) Oral once  dextrose 5%. 1000 milliLiter(s) (50 mL/Hr) IV Continuous <Continuous>  dextrose 5%. 1000 milliLiter(s) (100 mL/Hr) IV Continuous <Continuous>  dextrose 50% Injectable 25 Gram(s) IV Push once  dextrose 50% Injectable 12.5 Gram(s) IV Push once  dextrose 50% Injectable 25 Gram(s) IV Push once  enoxaparin Injectable 40 milliGRAM(s) SubCutaneous daily  glucagon  Injectable 1 milliGRAM(s) IntraMuscular once  insulin glargine Injectable (LANTUS) 14 Unit(s) SubCutaneous at bedtime  insulin lispro (ADMELOG) corrective regimen sliding scale   SubCutaneous three times a day before meals  insulin lispro (ADMELOG) corrective regimen sliding scale   SubCutaneous at bedtime  insulin lispro Injectable (ADMELOG) 6 Unit(s) SubCutaneous three times a day before meals    MEDICATIONS  (PRN):      CAPILLARY BLOOD GLUCOSE      POCT Blood Glucose.: 141 mg/dL (09 May 2021 22:04)  POCT Blood Glucose.: 187 mg/dL (09 May 2021 18:30)  POCT Blood Glucose.: 214 mg/dL (09 May 2021 13:11)  POCT Blood Glucose.: 144 mg/dL (09 May 2021 08:41)    I&O's Summary    08 May 2021 07:01  -  09 May 2021 07:00  --------------------------------------------------------  IN: 1400 mL / OUT: 1150 mL / NET: 250 mL        PHYSICAL EXAM:  Vital Signs Last 24 Hrs  T(C): 36.8 (09 May 2021 22:36), Max: 36.8 (09 May 2021 15:16)  T(F): 98.3 (09 May 2021 22:36), Max: 98.3 (09 May 2021 15:16)  HR: 77 (09 May 2021 22:36) (77 - 80)  BP: 120/62 (09 May 2021 22:36) (120/62 - 121/78)  BP(mean): --  RR: 18 (09 May 2021 22:36) (18 - 19)  SpO2: 100% (09 May 2021 22:36) (99% - 100%)    GENERAL: NAD, well-developed, AAOx4  HEAD:  Atraumatic, Normocephalic  EYES: EOMI, PERRLA, conjunctiva and sclera clear  NECK: Supple, No JVD  LUNG: Clear to auscultation bilaterally; No wheezes or crackles, no tachypnea  HEART: Regular rate and rhythm; No murmurs, rubs, or gallops, no edema  ABDOMEN: Soft, Nontender, Nondistended; Bowel sounds present  EXTREMITIES:  2+ Peripheral Pulses, No clubbing, cyanosis, or edema  NEUROLOGY: non-focal deficits, sensation intact to pinprick  SKIN: No rashes or lesions  Psych: Not depressed or anxious.    LABS:                        12.6   6.85  )-----------( 193      ( 09 May 2021 07:14 )             35.0     05-09    140  |  107  |  6<L>  ----------------------------<  179<H>  3.7   |  22  |  0.68    Ca    8.4      09 May 2021 07:14  Phos  2.8     05-  Mg     2.0     05-09    TPro  7.5  /  Alb  4.1  /  TBili  1.7<H>  /  DBili  x   /  AST  14  /  ALT  5   /  AlkPhos  2829<H>  05-08      CARDIAC MARKERS ( 08 May 2021 14:42 )  x     / x     / 43 U/L / x     / x          Urinalysis Basic - ( 08 May 2021 13:44 )    Color: Light Yellow / Appearance: Clear / S.038 / pH: x  Gluc: x / Ketone: Negative  / Bili: Negative / Urobili: <2 mg/dL   Blood: x / Protein: Negative / Nitrite: Negative   Leuk Esterase: Negative / RBC: x / WBC x   Sq Epi: x / Non Sq Epi: x / Bacteria: x        Culture - Blood (collected 08 May 2021 21:06)  Source: .Blood Blood-Venous  Preliminary Report (09 May 2021 22:02):    No growth to date.    Culture - Blood (collected 08 May 2021 21:06)  Source: .Blood Blood-Peripheral  Preliminary Report (09 May 2021 22:02):    No growth to date.        RADIOLOGY & ADDITIONAL TESTS:  Results Reviewed.

## 2021-05-10 NOTE — DIETITIAN INITIAL EVALUATION ADULT. - ADD RECOMMEND
1. Continue current diet order, which remains appropriate at this time. 2. c/w vitamin D supplement as medically indicated per MD order. 3. Monitor weights, labs, BM's, skin integrity, PO intake/tolerance. 4. Reinforce diet education with pt PRN. 5. Recommend patient to follow outpatient endocrinologist/RDN for long term DM management.

## 2021-05-10 NOTE — PROGRESS NOTE ADULT - SUBJECTIVE AND OBJECTIVE BOX
Chief Complaint: DM2, Pagets    History: tolerating po  no hypoglycemia events or symptoms    MEDICATIONS  (STANDING):  atorvastatin 40 milliGRAM(s) Oral at bedtime  dextrose 40% Gel 15 Gram(s) Oral once  dextrose 5%. 1000 milliLiter(s) (50 mL/Hr) IV Continuous <Continuous>  dextrose 5%. 1000 milliLiter(s) (100 mL/Hr) IV Continuous <Continuous>  dextrose 50% Injectable 25 Gram(s) IV Push once  dextrose 50% Injectable 12.5 Gram(s) IV Push once  dextrose 50% Injectable 25 Gram(s) IV Push once  enoxaparin Injectable 40 milliGRAM(s) SubCutaneous daily  glucagon  Injectable 1 milliGRAM(s) IntraMuscular once  insulin glargine Injectable (LANTUS) 14 Unit(s) SubCutaneous at bedtime  insulin lispro (ADMELOG) corrective regimen sliding scale   SubCutaneous three times a day before meals  insulin lispro (ADMELOG) corrective regimen sliding scale   SubCutaneous at bedtime  insulin lispro Injectable (ADMELOG) 6 Unit(s) SubCutaneous three times a day before meals    MEDICATIONS  (PRN):      Allergies    No Known Allergies    Intolerances      Review of Systems:  ALL OTHER SYSTEMS REVIEWED AND NEGATIVE      PHYSICAL EXAM:  VITALS: T(C): 36.7 (05-10-21 @ 07:07)  T(F): 98.1 (05-10-21 @ 07:07), Max: 98.3 (05-09-21 @ 15:16)  HR: 69 (05-10-21 @ 07:07) (69 - 80)  BP: 104/64 (05-10-21 @ 07:07) (104/64 - 121/78)  RR:  (18 - 19)  SpO2:  (99% - 100%)  Wt(kg): --  GENERAL: NAD, well-groomed, well-developed  EYES: No proptosis, no lid lag, anicteric  HEENT:  Atraumatic, Normocephalic, moist mucous membranes  RESPIRATORY: nonlabored respirations, no wheezing  PSYCH: Alert and oriented x 3, normal affect, normal mood    CAPILLARY BLOOD GLUCOSE      POCT Blood Glucose.: 121 mg/dL (10 May 2021 12:01)  POCT Blood Glucose.: 104 mg/dL (10 May 2021 08:48)  POCT Blood Glucose.: 141 mg/dL (09 May 2021 22:04)  POCT Blood Glucose.: 187 mg/dL (09 May 2021 18:30)      05-10    140  |  107  |  8   ----------------------------<  84  3.3<L>   |  20<L>  |  0.76    EGFR if : 108  EGFR if non : 93    Ca    8.3<L>      05-10  Mg     1.8     05-10  Phos  3.1     05-10    TPro  5.9<L>  /  Alb  3.1<L>  /  TBili  1.4<H>  /  DBili  x   /  AST  16  /  ALT  6   /  AlkPhos  1987<H>  05-10      A1C with Estimated Average Glucose Result: 12.8 % (05-08-21 @ 13:10)      Thyroid Function Tests:      < from: Xray Spine Entire Thoracolumbar 2 or 3 Views (05.09.21 @ 17:35) >    EXAM:  XR SPINE ENTIRE THOR LUM 2-3V        PROCEDURE DATE:  May  9 2021         INTERPRETATION:  CLINICAL INDICATION: back pain; Paget's disease    EXAM:  AP lateral thoracic and lumbosacral spine from 5/9/2021 at 1735. Reviewed in conjunction with CT from previous day.    IMPRESSION:  Redemonstrated Pagetic type changes involving L1 vertebra and posterior elements and partially visualized sacrum and bilateral innominate bones.    No compression fractures spondylolistheses or spondylolysis defects.    Thoracolumbar region anterior disc margin osteophytes again noted otherwise relatively preserved intervertebral disc spaces.              PRINCE SCHMID MD; Attending Radiologist  This document has been electronically signed. May 10 2021 11:21AM    < end of copied text >    < from: Xray Pelvis 2 views (05.09.21 @ 17:34) >    EXAM:  RAD PELVIS 2 VIEWS        PROCEDURE DATE:  May  9 2021         INTERPRETATION:  CLINICAL INDICATION: Paget's disease    EXAM:  AP views of the pelvis and hips from 5/9/2021 at 1734. Reviewed in conjunction with CT from previous day.    IMPRESSION:  Redemonstrated stigmata of Paget's disease involving sacrum, bilateral innominate bones, and proximal right femur manifest by generalized enlargement/expansion of the involved osseous structures with cortical thickening and coarsened trabecular markings.    No fractures or dislocations.    No sacroiliac or pubic symphysis diastases.              PRINCE SCHMID MD; Attending Radiologist  This document has been electronically signed. May 10 2021 11:18AM    < end of copied text >

## 2021-05-11 ENCOUNTER — TRANSCRIPTION ENCOUNTER (OUTPATIENT)
Age: 71
End: 2021-05-11

## 2021-05-11 VITALS
DIASTOLIC BLOOD PRESSURE: 63 MMHG | RESPIRATION RATE: 18 BRPM | HEART RATE: 73 BPM | OXYGEN SATURATION: 100 % | SYSTOLIC BLOOD PRESSURE: 112 MMHG

## 2021-05-11 LAB
ALBUMIN SERPL ELPH-MCNC: 3.1 G/DL — LOW (ref 3.3–5)
ALP SERPL-CCNC: 1981 U/L — HIGH (ref 40–120)
ALT FLD-CCNC: 5 U/L — SIGNIFICANT CHANGE UP (ref 4–41)
ANION GAP SERPL CALC-SCNC: 9 MMOL/L — SIGNIFICANT CHANGE UP (ref 7–14)
AST SERPL-CCNC: 15 U/L — SIGNIFICANT CHANGE UP (ref 4–40)
BILIRUB SERPL-MCNC: 1.5 MG/DL — HIGH (ref 0.2–1.2)
BUN SERPL-MCNC: 6 MG/DL — LOW (ref 7–23)
CALCIUM SERPL-MCNC: 8.4 MG/DL — SIGNIFICANT CHANGE UP (ref 8.4–10.5)
CHLORIDE SERPL-SCNC: 108 MMOL/L — HIGH (ref 98–107)
CO2 SERPL-SCNC: 24 MMOL/L — SIGNIFICANT CHANGE UP (ref 22–31)
CREAT SERPL-MCNC: 0.78 MG/DL — SIGNIFICANT CHANGE UP (ref 0.5–1.3)
GLUCOSE SERPL-MCNC: 94 MG/DL — SIGNIFICANT CHANGE UP (ref 70–99)
HCT VFR BLD CALC: 35.9 % — LOW (ref 39–50)
HGB BLD-MCNC: 12.2 G/DL — LOW (ref 13–17)
MAGNESIUM SERPL-MCNC: 1.8 MG/DL — SIGNIFICANT CHANGE UP (ref 1.6–2.6)
MCHC RBC-ENTMCNC: 28.2 PG — SIGNIFICANT CHANGE UP (ref 27–34)
MCHC RBC-ENTMCNC: 34 GM/DL — SIGNIFICANT CHANGE UP (ref 32–36)
MCV RBC AUTO: 83.1 FL — SIGNIFICANT CHANGE UP (ref 80–100)
NRBC # BLD: 0 /100 WBCS — SIGNIFICANT CHANGE UP
NRBC # FLD: 0 K/UL — SIGNIFICANT CHANGE UP
PHOSPHATE SERPL-MCNC: 3 MG/DL — SIGNIFICANT CHANGE UP (ref 2.5–4.5)
PLATELET # BLD AUTO: 184 K/UL — SIGNIFICANT CHANGE UP (ref 150–400)
POTASSIUM SERPL-MCNC: 3.7 MMOL/L — SIGNIFICANT CHANGE UP (ref 3.5–5.3)
POTASSIUM SERPL-SCNC: 3.7 MMOL/L — SIGNIFICANT CHANGE UP (ref 3.5–5.3)
PROT SERPL-MCNC: 6 G/DL — SIGNIFICANT CHANGE UP (ref 6–8.3)
RBC # BLD: 4.32 M/UL — SIGNIFICANT CHANGE UP (ref 4.2–5.8)
RBC # FLD: 13 % — SIGNIFICANT CHANGE UP (ref 10.3–14.5)
SODIUM SERPL-SCNC: 141 MMOL/L — SIGNIFICANT CHANGE UP (ref 135–145)
WBC # BLD: 6.2 K/UL — SIGNIFICANT CHANGE UP (ref 3.8–10.5)
WBC # FLD AUTO: 6.2 K/UL — SIGNIFICANT CHANGE UP (ref 3.8–10.5)

## 2021-05-11 PROCEDURE — 99239 HOSP IP/OBS DSCHRG MGMT >30: CPT

## 2021-05-11 RX ORDER — ATORVASTATIN CALCIUM 80 MG/1
1 TABLET, FILM COATED ORAL
Qty: 0 | Refills: 0 | DISCHARGE
Start: 2021-05-11

## 2021-05-11 RX ORDER — INSULIN ASPART 100 [IU]/ML
5 INJECTION, SOLUTION SUBCUTANEOUS
Qty: 5 | Refills: 0
Start: 2021-05-11 | End: 2021-06-09

## 2021-05-11 RX ORDER — ENOXAPARIN SODIUM 100 MG/ML
12 INJECTION SUBCUTANEOUS
Qty: 5 | Refills: 0
Start: 2021-05-11 | End: 2021-06-09

## 2021-05-11 RX ADMIN — ENOXAPARIN SODIUM 40 MILLIGRAM(S): 100 INJECTION SUBCUTANEOUS at 12:33

## 2021-05-11 RX ADMIN — Medication 6 UNIT(S): at 12:33

## 2021-05-11 RX ADMIN — Medication 6 UNIT(S): at 09:18

## 2021-05-11 NOTE — DISCHARGE NOTE PROVIDER - NSDCMRMEDTOKEN_GEN_ALL_CORE_FT
alcohol swabs : Apply topically to affected area 4 times a day  test script please page: [phone]    atorvastatin 40 mg oral tablet: 1 tab(s) orally once a day (at bedtime)  ergocalciferol 50,000 intl units (1.25 mg) oral capsule: 1 cap(s) orally once a week   test script please page: [phone]   glucometer (per patient&#x27;s insurance): Test blood sugars four times a day. Dispense #1 glucometer.  test script please page: [phone]   Insulin Pen Needles, 4mm: 1 application subcutaneously 4 times a day. ** Use with insulin pen **   test script please page: [phone]   lancets: 1 application subcutaneously 4 times a day   ICD: E11.9  test script please page: [phone]   Lantus Solostar Pen 100 units/mL subcutaneous solution: 12 unit(s) subcutaneous once a day (at bedtime)  test script please page: [phone]   NovoLOG FlexPen 100 units/mL injectable solution: 5 unit(s) injectable 3 times a day (before meals)  test script please page: [phone]   test strips (per patient&#x27;s insurance): 1 application subcutaneously 4 times a day. ** Compatible with patient&#x27;s glucometer **  ICD: E11.9  test script please page: [phone]

## 2021-05-11 NOTE — DISCHARGE NOTE PROVIDER - CARE PROVIDER_API CALL
Saen Evans  MEDICINE  [address] [place] [address]  Phone: [phone]  Fax: [phone]  Follow Up Time:     Jerica Aleman  ENDOCRINOLOGY/METAB/DIABETES  [address] [place] [address]  Phone: [phone]  Fax: [phone]  Follow Up Time:

## 2021-05-11 NOTE — PROGRESS NOTE ADULT - PROBLEM SELECTOR PROBLEM 1
Type 2 diabetes mellitus with hyperglycemia, without long-term current use of insulin
Hyperglycemia due to diabetes mellitus

## 2021-05-11 NOTE — PROGRESS NOTE ADULT - PROBLEM SELECTOR PLAN 2
Noted to have cortical thickening and sclerosis of right clavicle, pubic symphysis, and ischium. Findings suggestive of Paget's disease. Notable alkaline phosphatase of 2829, pt does have b/l shoulder and neck pain  -Vitamin D level 6.3 (low). s/p 44045 units  - Will discharge with weekly Vitamin D repletion and OP follow-up for Paget disease.   - F/u XRay pelvis and thoracolumbar spine final reads  - Appreciate endocrine recs.

## 2021-05-11 NOTE — DISCHARGE NOTE PROVIDER - HOSPITAL COURSE
HPI:    71 y/o male with pmh of HTN, DM, and HLD presents to the ED for increasing fatigue. The patient stated this morning, he was laying in bed with his wife and was not "feeling well." His wife tried to elicit conversation from him, however the patient refrained from speaking with her and due to this concern she called the ambulance. The patient states he did not have any SOB, chest pain, or palpitations. No reported abdominal discomfort, nausea, or episodes of emesis. He was not lightheaded and did not lose consciousness. He has been having polydipsia and polyuria, denies any signs of neuropathy of upper or lower extremities and any change in vision from his baseline (wears glasses). Noted by pt's wife, to have been SOB and with right shoulder discomfort prior to coming to the ED. Wife states on several occasions the patient did not "look well," and she has called the ambulance every 2 months for these symptoms, the patient normally refuses to go to the hospital, however this time he agreed to. He admits that he has not seen a primary care doctor in "years" and that he has not left his house in 4 years, since he retired. He stays in bed most of the day, by choice, however states he is able to ambulate without any assistance. He does not take any medications for his chronic medical conditions. He notes weight loss within the past 4 years from a baseline of 170-175 to 148 and endorses a good appetite. Admits to having multiple types of snacks present in his drawers. Drinks a lot of soda and eats a lot of chips.    ED VS: 98 F, 92, 129/75, 100% on RA. Fingerstick: 535   ED tx: 10 U Lantus subq and 6 U Admelog, 2L NS bolus  EKG : NSR, RBBB, TWI in leads V1-V3    Hospital Course:    Patient A1c was found to be elevated to 12.8 on admission. Endocrinology was consulted and patient was placed on basal/bolus insulin. Patient was given insulin teaching and was discharged on 12 units long-acting insulin and 5 units of short-acting premeal insulin. Patient was also found to have elevated alkaline phosphatase levels to 2829 and imaging with cortical thickening and sclerosis of the right clavicle, suggestive of Paget disease. Spine and hip Xray was done for baseline imaging. Vitamin D level was low at 6.3, and patient was given 50,000 units vitamin Dx1. Patient was counseled to follow-up with outpatient endocrinologist for management of both his diabetes and Paget disease. Patient was also started on lipitor during his stay.    On 5/11/21, patient was deemed stable for discharge home. HPI:    71 y/o male with pmh of HTN, DM, and HLD presents to the ED for increasing fatigue. The patient stated this morning, he was laying in bed with his wife and was not "feeling well." His wife tried to elicit conversation from him, however the patient refrained from speaking with her and due to this concern she called the ambulance. The patient states he did not have any SOB, chest pain, or palpitations. No reported abdominal discomfort, nausea, or episodes of emesis. He was not lightheaded and did not lose consciousness. He has been having polydipsia and polyuria, denies any signs of neuropathy of upper or lower extremities and any change in vision from his baseline (wears glasses). Noted by pt's wife, to have been SOB and with right shoulder discomfort prior to coming to the ED. Wife states on several occasions the patient did not "look well," and she has called the ambulance every 2 months for these symptoms, the patient normally refuses to go to the hospital, however this time he agreed to. He admits that he has not seen a primary care doctor in "years" and that he has not left his house in 4 years, since he retired. He stays in bed most of the day, by choice, however states he is able to ambulate without any assistance. He does not take any medications for his chronic medical conditions. He notes weight loss within the past 4 years from a baseline of 170-175 to 148 and endorses a good appetite. Admits to having multiple types of snacks present in his drawers. Drinks a lot of soda and eats a lot of chips.    ED VS: 98 F, 92, 129/75, 100% on RA. Fingerstick: 535   ED tx: 10 U Lantus subq and 6 U Admelog, 2L NS bolus  EKG : NSR, RBBB, TWI in leads V1-V3.    Hospital Course:    Patient A1c was found to be elevated to 12.8 on admission. Endocrinology was consulted and patient was placed on basal/bolus insulin. Patient was given insulin teaching and was discharged on 12 units long-acting insulin and 5 units of short-acting premeal insulin. Patient was also found to have elevated alkaline phosphatase levels to 2829 and imaging with cortical thickening and sclerosis of the right clavicle, suggestive of Paget disease. Spine and hip Xray was done for baseline imaging. Vitamin D level was low at 6.3, and patient was given 50,000 units vitamin Dx1. Patient was counseled to follow-up with outpatient endocrinologist for management of both his diabetes and Paget disease. Patient was also started on lipitor during his stay.    On 5/11/21, patient was deemed stable for discharge home.

## 2021-05-11 NOTE — PROGRESS NOTE ADULT - PROBLEM SELECTOR PLAN 3
NSR, RBBB with repolarization abnormality. No priors to compare  Advised patient to follow up after discharge with a cardiologist for TTE  Trops flat, no CP, probnp normal, euvolemic on exam
Noted to have pH: 7.32 downtrending to 7.28, with elevated lactate of 3.8 uptrended to 4.7, mild anion gap of 15 downtrended to 12. S/p 2L NS bolus in ED. BHB 0.2. No ketones in urine.   -Most likely Type B lactic acidosis 2/2 to the patient's hyperglycemia  - Lactate now wnl at 1.8  - RESOLVED.
Noted to have pH: 7.32 downtrending to 7.28, with elevated lactate of 3.8 uptrended to 4.7, mild anion gap of 15 downtrended to 12. S/p 2L NS bolus in ED. BHB 0.2. No ketones in urine.   -Most likely Type B lactic acidosis 2/2 to the patient's hyperglycemia  - Lactate now wnl at 1.8  - RESOLVED.

## 2021-05-11 NOTE — DISCHARGE NOTE PROVIDER - NSDCCPCAREPLAN_GEN_ALL_CORE_FT
PRINCIPAL DISCHARGE DIAGNOSIS  Diagnosis: Hyperglycemia  Assessment and Plan of Treatment: You were admitted with elevated bloog glucose levels. The endocrine team saw your and recommended that you be discharged on insulin pens at home. Please take 12 units of long-acting insulin once day at bedtime. Please also take 5 units of short-acting insulin three times a day before meals. Please pick-up your insulin from [facility] pharmacy downstairs after discharge.  Please check your fingerstick levels throughout the day as well. Please schedule an appointment to follow-up with your endocrinologist, Dr. Aleman within one week of discharge.      SECONDARY DISCHARGE DIAGNOSES  Diagnosis: Paget disease of bone  Assessment and Plan of Treatment: You were found to have high alkaline phosphate levels and imaging that showed thickening of the bones in your pelvis and clavicle. This was diagnostic for Paget disease of the bone. You were found to have very low levels of vitamin D as well. A prescription of 50,000 units of Vitamin D  has been sent to [facility]. Please take this medication one time a week. Please also follow-up with Dr. Aleman within one week of discharge. It is important to notify Dr. Aleman so that she can further manage your Paget disease as outpatient.    Diagnosis: Hyperlipidemia  Assessment and Plan of Treatment: You were found to have high cholesterol levels. We have started you on a medication called atorvastatin. Please continue to take this medication and follow-up with your primary care provider within one week of discharge.    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 You kidney function was normal during your stay and it was recommended to monitor. Please follow-up with your primary care provider within one week of discharge.    Diagnosis: Abnormal EKG  Assessment and Plan of Treatment: You were found to have an abnormal EKG on admission that showed a right bundle branch block. Your cardiac enzymes were within normal limits and you did not complain of further chest pain. Please follow-up withi your primary care provider within one week discharge.

## 2021-05-11 NOTE — PROGRESS NOTE ADULT - SUBJECTIVE AND OBJECTIVE BOX
PROGRESS NOTE:   Authored by Carola Purvis MD  Pager: [facility] [phone]; [facility] [phone]    Patient is a 70y old  Male who presents with a chief complaint of Weakness (10 May 2021 13:59)      SUBJECTIVE / OVERNIGHT EVENTS:  NAEON. FS controlled on current regimen. This AM, denies CP, SOB, subjective f/c, n/v.     MEDICATIONS  (STANDING):  atorvastatin 40 milliGRAM(s) Oral at bedtime  dextrose 40% Gel 15 Gram(s) Oral once  dextrose 5%. 1000 milliLiter(s) (50 mL/Hr) IV Continuous <Continuous>  dextrose 5%. 1000 milliLiter(s) (100 mL/Hr) IV Continuous <Continuous>  dextrose 50% Injectable 25 Gram(s) IV Push once  dextrose 50% Injectable 12.5 Gram(s) IV Push once  dextrose 50% Injectable 25 Gram(s) IV Push once  enoxaparin Injectable 40 milliGRAM(s) SubCutaneous daily  glucagon  Injectable 1 milliGRAM(s) IntraMuscular once  insulin glargine Injectable (LANTUS) 14 Unit(s) SubCutaneous at bedtime  insulin lispro (ADMELOG) corrective regimen sliding scale   SubCutaneous at bedtime  insulin lispro (ADMELOG) corrective regimen sliding scale   SubCutaneous three times a day before meals  insulin lispro Injectable (ADMELOG) 6 Unit(s) SubCutaneous three times a day before meals    MEDICATIONS  (PRN):      CAPILLARY BLOOD GLUCOSE      POCT Blood Glucose.: 126 mg/dL (10 May 2021 22:11)  POCT Blood Glucose.: 137 mg/dL (10 May 2021 18:10)  POCT Blood Glucose.: 121 mg/dL (10 May 2021 12:01)  POCT Blood Glucose.: 104 mg/dL (10 May 2021 08:48)    I&O's Summary    10 May 2021 07:01  -  11 May 2021 07:00  --------------------------------------------------------  IN: 350 mL / OUT: 0 mL / NET: 350 mL        PHYSICAL EXAM:  Vital Signs Last 24 Hrs  T(C): 36.2 (11 May 2021 05:56), Max: 36.9 (10 May 2021 15:05)  T(F): 97.2 (11 May 2021 05:56), Max: 98.4 (10 May 2021 15:05)  HR: 71 (11 May 2021 05:56) (69 - 91)  BP: 121/- (11 May 2021 05:56) (104/64 - 149/68)  BP(mean): --  RR: 17 (11 May 2021 05:56) (17 - 18)  SpO2: 100% (11 May 2021 05:56) (98% - 100%)    GENERAL: NAD, well-developed, AAOx4  HEAD:  Atraumatic, Normocephalic  EYES: EOMI, PERRLA, conjunctiva and sclera clear  NECK: Supple, No JVD  LUNG: Clear to auscultation bilaterally; No wheezes or crackles, no tachypnea  HEART: Regular rate and rhythm; No murmurs, rubs, or gallops, no edema  ABDOMEN: Soft, Nontender, Nondistended; Bowel sounds present  EXTREMITIES:  2+ Peripheral Pulses, No clubbing, cyanosis, or edema  NEUROLOGY: non-focal deficits, sensation intact to pinprick  SKIN: No rashes or lesions        LABS:                        12.2   6.20  )-----------( 184      ( 11 May 2021 06:45 )             35.9     05-10    140  |  107  |  8   ----------------------------<  84  3.3<L>   |  20<L>  |  0.76    Ca    8.3<L>      10 May 2021 06:58  Phos  3.1     05-10  Mg     1.8     05-10    TPro  5.9<L>  /  Alb  3.1<L>  /  TBili  1.4<H>  /  DBili  x   /  AST  16  /  ALT  6   /  AlkPhos  1987<H>  05-10              Culture - Blood (collected 08 May 2021 21:06)  Source: .Blood Blood-Venous  Preliminary Report (09 May 2021 22:02):    No growth to date.    Culture - Blood (collected 08 May 2021 21:06)  Source: .Blood Blood-Peripheral  Preliminary Report (09 May 2021 22:02):    No growth to date.        RADIOLOGY & ADDITIONAL TESTS:  Results Reviewed.

## 2021-05-11 NOTE — PROGRESS NOTE ADULT - PROBLEM SELECTOR PLAN 1
Initial symptom of weakness as per patient, found to be hyperglycemic with BS: 535, acidotic (7.32 downtrending to 7.28), however HCO3 normal, U/A negative for ketones, positive for glucose, AG: 15 to 12, and with normal mental status. A1c: 12.8 (5/2021). S/p 10 U Lantus and 6 U Admelog in ED  - c/w 14 U Lantus and 6 U Admelog TID  -c/w low ISS  - Will require Diabetes education and teaching  - Follows with Dr. Aleman of [facility]  - Appreciate endocrine recs.

## 2021-05-11 NOTE — DISCHARGE NOTE PROVIDER - NSDCFUADDINST_GEN_ALL_CORE_FT
Please pick-up your medications from [facility] downstairs after discharge.

## 2021-05-11 NOTE — PROGRESS NOTE ADULT - ATTENDING COMMENTS
69 y/o male with pmhx as stated above a/w HHS in setting of uncontrolled DM2 and also found to have pagets disease.     -Monitor FS. C/w statin and insulin regimen as stated above. S/p DM education and insulin teaching. Endocrine following - will finalize discharge regimen.   -Plan is for outpt treatment of pagets disease given significantly low levels of Vit D. C/w weekly [id number] of vit D.     Dc planning likely for today. Further details outlined in discharge documentation. Spent 34 min in discharge planning and coordination.
69 y/o male with pmhx as stated above a/w HHS in setting of uncontrolled DM2 and also found to have pagets disease.     -Monitor FS. C/w insulin regimen as stated above. DM education and insulin teaching. Agree w/ statin in the setting of DM. Endocrine following and recs for discharge appreciated.   -Plan is for outpt treatment of pagets disease given significantly low levels of Vit D. C/w weekly [id number] of vit D.     Dc planning likely for today/tomorrow.
Patient seen and examined at bedside. The patient is a 69 yo man with PMH of HTN, T2DM (previously on metformin) non adherent with medication presented with worsening fatigue polyuria and polydipsia    - started basal bolus for T2DM, A1c 12 will need insulin teaching prior to d/c, FS improved c/w 14 units lantus and 5 units premeal, f/u FRANCISCO-65 antibody, Islet cell antibody, zinc transporter, C-peptide  - elevated alk phos w/ xray findings concerning for Paget disease, will get PTH and vit D. f/u endo reccs regarding bisphosphonates

## 2021-05-11 NOTE — PROGRESS NOTE ADULT - PROBLEM SELECTOR PLAN 4
CTA performed originally for concern for hypoxia, incidental finding of bilateral renal cysts and horseshoe kidney, cysts not noted in any other visceral organs  Currently without any CVA tenderness, fevers, or chills  Monitor kidney function daily  Will need outpatient follow up for monitoring of renal cysts
NSR, RBBB with repolarization abnormality. No priors to compare  Advised patient to follow up after discharge with a cardiologist for TTE  Trops flat, no CP, probnp normal, euvolemic on exam
NSR, RBBB with repolarization abnormality. No priors to compare  Advised patient to follow up after discharge with a cardiologist for TTE  Trops flat, no CP, probnp normal, euvolemic on exam

## 2021-05-11 NOTE — PROGRESS NOTE ADULT - ASSESSMENT
71 y/o male with pmh of HTN, DM, and HLD presents to the ED for increasing fatigue found to have hyperglycemia and lactic acidosis in the setting of uncontrolled diabetes mellitus.
71 y/o male with T2DM (uncontrolled, HBA1C 12.8%) (not on therapy), HTN, HLD p/w polyuria, polydipsia fatigue. Endocrine consulted for uncontrolled T2Dm with hyperglycemia, but also found to have Pagets disease. Complex patient high level decision making.    1) uncontrolled DM (HBA1C 12.8), likely Type 2, but rule out LEXII/DM1 given insulin requirement low and not overweight. With hyperglycemia. FBG at goal this morning with Lantus 10 units last night. Suspect insulin requirement lower than expected overnight due to little PO intake yesterday.   - C-peptide resulted 0.4 low  -Follow up FRANCISCO antibody, Islet cell antibody, zinc transporter antibody, testing in lab.  - goal glucose 100-180 mg/dL, improved in goal range today  - Continue Lantus 14 units qhs  - Continue Admelog 6 units TID pre-meal  - Continue scales at low pre-meal correction scale and low HS correction scale  - check FS AC/HS  - carb consistent diet  - registered dietician eval done today.  - insulin pen teaching and glucometer review prior to DC. RN and primary team notified.    Discharge plan:  -C-peptide is quite low  - Plan for dc on basal and bolus insulin pens, as per insurance.  -Possible discharge tomorrow, for now can test out insurance coverage with current doses of basal and bolus insulin.  -BD lucita pen needles  -Glucometer, test strips, lancets  -For outpatient follow up he will resume follow up with prior endocrinologist had not seen past few years Dr. Jerica Aleman    2) HTN  goal BP<130/80 (at goal)  manage per primary team    3) HLD  atorvastatin 40mg    4) Paget's disease. ALP markedly elevated at 2829 with incidental findings suggestive of Paget's disease at multiple sites including pelvis on CT. Given markedly elevated ALP with findings consistent with paget's in site that is high risk for fracture (spine/pelvis), treatment for Paget's is indicated.  ALP downtrend to 1987 today  - XR pelvis and spine performed for baseline imaging in order to follow progression of disease later on as outpatient, confirmed Pagetic lesions. Reports reviewed.  - checked vitamin D 25-OH level to ensure vitamin D level is adequate prior to dosing bisphosphonate (if vitamin D level low, pt is at higher risk for hypocalcemia post- bisphosphonate therapy) - found to have severely low 25OH vit D level of 6.5. Given one dose of ergocalciferol 50,000 units weekly.  -Given how low vitamin D level is and risk for hypocalcemia as outpatient (likely dc home tomorrow) would replete with ergocalciferol 50,000 units weekly for now (continue x 3 months at least) and plan for treatment of Pagets in the outpatient setting with Dr. Aleman.      Plan reviewed with primary team.    Chyna Lopez MD  Division of Endocrinology  Pager: [phone]    If after 6PM or before 9AM, or on weekends/holidays, please call endocrine answering service for assistance ([phone]).  For nonurgent matters email [email or web address] for assistance.      
69 y/o male with pmh of HTN, DM, and HLD presents to the ED for increasing fatigue found to have hyperglycemia and lactic acidosis in the setting of uncontrolled diabetes mellitus.
69 y/o male with pmh of HTN, DM, and HLD presents to the ED for increasing fatigue found to have hyperglycemia and lactic acidosis in the setting of uncontrolled diabetes mellitus.

## 2021-05-11 NOTE — DISCHARGE NOTE NURSING/CASE MANAGEMENT/SOCIAL WORK - PATIENT PORTAL LINK FT
You can access the FollowMyHealth Patient Portal offered by [facility] by registering at the following website: [email or web address]. By joining [facility]’s FollowMyHealth portal, you will also be able to view your health information using other applications (apps) compatible with our system.

## 2021-05-11 NOTE — PROGRESS NOTE ADULT - PROBLEM SELECTOR PLAN 6
BP stable for now  Pt has not been on antihypertensives for years  Will monitor BP, if elevated can start on ACEi or ARB
24-Mar-2018 02:24

## 2021-05-13 LAB
CULTURE RESULTS: SIGNIFICANT CHANGE UP
CULTURE RESULTS: SIGNIFICANT CHANGE UP
ISLET CELL512 AB SER-ACNC: SIGNIFICANT CHANGE UP
SPECIMEN SOURCE: SIGNIFICANT CHANGE UP
SPECIMEN SOURCE: SIGNIFICANT CHANGE UP

## 2021-05-14 LAB — GAD65 AB SER-MCNC: 0 NMOL/L — SIGNIFICANT CHANGE UP

## 2021-05-19 LAB — ZINC TRANSPORTER 8 AB, RESULT: <15 U/ML — SIGNIFICANT CHANGE UP

## 2021-09-27 NOTE — ED ADULT NURSE NOTE - NSFALLRSKUNASSIST_ED_ALL_ED
Problem: Skin Integrity:  Goal: Will show no infection signs and symptoms  Description: Will show no infection signs and symptoms  Outcome: Ongoing  Goal: Absence of new skin breakdown  Description: Absence of new skin breakdown  Outcome: Ongoing  Goal: Demonstration of wound healing without infection will improve  Description: Demonstration of wound healing without infection will improve  Outcome: Ongoing  Goal: Complications related to intravenous access or infusion will be avoided or minimized  Description: Complications related to intravenous access or infusion will be avoided or minimized  Outcome: Ongoing     Problem: Falls - Risk of:  Goal: Will remain free from falls  Description: Will remain free from falls  Outcome: Ongoing  Goal: Absence of physical injury  Description: Absence of physical injury  Outcome: Ongoing     Problem: Nutritional:  Goal: Nutritional status will improve  Description: Nutritional status will improve  Outcome: Ongoing     Problem: Physical Regulation:  Goal: Diagnostic test results will improve  Description: Diagnostic test results will improve  Outcome: Ongoing  Goal: Will remain free from infection  Description: Will remain free from infection  Outcome: Ongoing  Goal: Ability to maintain vital signs within normal range will improve  Description: Ability to maintain vital signs within normal range will improve  Outcome: Ongoing     Problem: Respiratory:  Goal: Ability to maintain normal respiratory secretions will improve  Description: Ability to maintain normal respiratory secretions will improve  Outcome: Ongoing no

## 2021-10-22 NOTE — ED ADULT NURSE NOTE - EXTENSIONS OF SELF_ADULT
Reason for Disposition  • Second attempt to contact family AND no contact made.  Answering service notified.    Protocols used: NO CONTACT OR DUPLICATE CONTACT CALL-A-AH    
----- Message from Jeanette Merritt CNP sent at 10/22/2021 11:06 AM CDT -----  Should see PMD for wart treatment or can try OTC kit. We have no way to treat it virtually. Please call.     No contact times 2 [phone]  
None

## 2022-05-01 NOTE — ED ADULT NURSE NOTE - OBJECTIVE STATEMENT
AAO4, resting with eyes closed and call light in reach. No falls noted this shift. bed kept in low position. Safe environment maintained. Bedside table & call light in reach. Uses call light appropriately when needing assistance. Vitals signs are stable.   Intake and output are being recorded, will continue to monitor Pt presents to rm 24, A&Ox4, ambulatory w/o assistance, pmhx of HTN and DM, here for evaluation of SOB and right arm pain since this morning. Pt currently stating "I am not in pain right now. I feel fine. My wife told me to come in." Denies chest pain, palpitations, diaphoresis, headaches, fevers, dizziness, nausea, vomiting, diarrhea, or urinary symptoms at this time. IV established in left arm with a 18G, labs drawn and sent, call bell in reach, warm blanket provided, bed in lowest position, side rails up x2, MD evaluation in progress. Will continue to monitor.

## 2023-01-09 NOTE — DISCHARGE NOTE NURSING/CASE MANAGEMENT/SOCIAL WORK - NSPROEXTENSIONSOFSELF_GEN_A_NUR
cardiac EP;   This visit is being performed virtually via Telephonic Visit. Consent to treat includes permission to submit charges to the patient's insurance. It was shared that without being seen and evaluated in person, there is a risk that the information and/or assessment may be incomplete or inaccurate. This telephonic visit may be discontinued by patient or clinician, if it is felt that the telephonic connections are not adequate for his situation.   Clinical Location: [facility] [address]  Luis Armando's location Home and is physically present in   the [facility] at the time of this visit.      none

## 2023-11-25 NOTE — PATIENT PROFILE ADULT - NS PRO AD NO ADVANCE DIRECTIVE
Pre-procedure checklist reviewed, AUC complete and pre-sedation note complete.    MD aware of maximum contrast dose of 270 mL. No

## 2025-01-07 ENCOUNTER — APPOINTMENT (OUTPATIENT)
Dept: ORTHOPEDIC SURGERY | Facility: CLINIC | Age: 75
End: 2025-01-07

## 2025-04-20 NOTE — H&P ADULT - PROBLEM SELECTOR PROBLEM 8
Patient's mom was given discharge papers. Patient's mom verbalizes understanding of discharge. Patient ambulatory out of ED    DVT prophylaxis